# Patient Record
Sex: FEMALE | Race: WHITE | NOT HISPANIC OR LATINO | Employment: FULL TIME | URBAN - METROPOLITAN AREA
[De-identification: names, ages, dates, MRNs, and addresses within clinical notes are randomized per-mention and may not be internally consistent; named-entity substitution may affect disease eponyms.]

---

## 2017-09-14 ENCOUNTER — HOSPITAL ENCOUNTER (OUTPATIENT)
Dept: GENERAL RADIOLOGY | Facility: HOSPITAL | Age: 37
Discharge: HOME | End: 2017-09-14
Admitting: INTERNAL MEDICINE

## 2017-09-14 ENCOUNTER — LAB (OUTPATIENT)
Dept: LAB | Facility: HOSPITAL | Age: 37
End: 2017-09-14
Attending: ORTHOPAEDIC SURGERY

## 2017-09-14 ENCOUNTER — TRANSCRIBE ORDERS (OUTPATIENT)
Dept: LAB | Facility: HOSPITAL | Age: 37
End: 2017-09-14

## 2017-09-14 DIAGNOSIS — Z01.818 PRE-OP TESTING: ICD-10-CM

## 2017-09-14 DIAGNOSIS — Z01.811 PRE-OP CHEST EXAM: ICD-10-CM

## 2017-09-14 DIAGNOSIS — Z01.818 PRE-OP TESTING: Primary | ICD-10-CM

## 2017-09-14 LAB
ALBUMIN SERPL-MCNC: 4.4 G/DL (ref 3.5–5.2)
ALBUMIN/GLOB SERPL: 1.8 G/DL
ALP SERPL-CCNC: 75 U/L (ref 39–117)
ALT SERPL W P-5'-P-CCNC: 28 U/L (ref 1–33)
ANION GAP SERPL CALCULATED.3IONS-SCNC: 10.6 MMOL/L
AST SERPL-CCNC: 20 U/L (ref 1–32)
BASOPHILS # BLD AUTO: 0.02 10*3/MM3 (ref 0–0.2)
BASOPHILS NFR BLD AUTO: 0.3 % (ref 0–1.5)
BILIRUB SERPL-MCNC: <0.2 MG/DL (ref 0.1–1.2)
BILIRUB UR QL STRIP: NEGATIVE
BUN BLD-MCNC: 16 MG/DL (ref 6–20)
BUN/CREAT SERPL: 17.6 (ref 7–25)
CALCIUM SPEC-SCNC: 9.4 MG/DL (ref 8.6–10.5)
CHLORIDE SERPL-SCNC: 108 MMOL/L (ref 98–107)
CLARITY UR: CLEAR
CO2 SERPL-SCNC: 25.4 MMOL/L (ref 22–29)
COLOR UR: YELLOW
CREAT BLD-MCNC: 0.91 MG/DL (ref 0.57–1)
DEPRECATED RDW RBC AUTO: 48.8 FL (ref 37–54)
EOSINOPHIL # BLD AUTO: 0.11 10*3/MM3 (ref 0–0.7)
EOSINOPHIL NFR BLD AUTO: 1.4 % (ref 0.3–6.2)
ERYTHROCYTE [DISTWIDTH] IN BLOOD BY AUTOMATED COUNT: 13.8 % (ref 11.7–13)
GFR SERPL CREATININE-BSD FRML MDRD: 69 ML/MIN/1.73
GLOBULIN UR ELPH-MCNC: 2.5 GM/DL
GLUCOSE BLD-MCNC: 84 MG/DL (ref 65–99)
GLUCOSE UR STRIP-MCNC: NEGATIVE MG/DL
HCT VFR BLD AUTO: 41.3 % (ref 35.6–45.5)
HGB BLD-MCNC: 13.3 G/DL (ref 11.9–15.5)
HGB UR QL STRIP.AUTO: NEGATIVE
IMM GRANULOCYTES # BLD: 0.02 10*3/MM3 (ref 0–0.03)
IMM GRANULOCYTES NFR BLD: 0.3 % (ref 0–0.5)
KETONES UR QL STRIP: NEGATIVE
LEUKOCYTE ESTERASE UR QL STRIP.AUTO: NEGATIVE
LYMPHOCYTES # BLD AUTO: 2.82 10*3/MM3 (ref 0.9–4.8)
LYMPHOCYTES NFR BLD AUTO: 36.8 % (ref 19.6–45.3)
MCH RBC QN AUTO: 31.3 PG (ref 26.9–32)
MCHC RBC AUTO-ENTMCNC: 32.2 G/DL (ref 32.4–36.3)
MCV RBC AUTO: 97.2 FL (ref 80.5–98.2)
MONOCYTES # BLD AUTO: 0.58 10*3/MM3 (ref 0.2–1.2)
MONOCYTES NFR BLD AUTO: 7.6 % (ref 5–12)
NEUTROPHILS # BLD AUTO: 4.12 10*3/MM3 (ref 1.9–8.1)
NEUTROPHILS NFR BLD AUTO: 53.6 % (ref 42.7–76)
NITRITE UR QL STRIP: NEGATIVE
PH UR STRIP.AUTO: 6.5 [PH] (ref 5–8)
PLATELET # BLD AUTO: 249 10*3/MM3 (ref 140–500)
PMV BLD AUTO: 10.1 FL (ref 6–12)
POTASSIUM BLD-SCNC: 4.1 MMOL/L (ref 3.5–5.2)
PROT SERPL-MCNC: 6.9 G/DL (ref 6–8.5)
PROT UR QL STRIP: NEGATIVE
RBC # BLD AUTO: 4.25 10*6/MM3 (ref 3.9–5.2)
SODIUM BLD-SCNC: 144 MMOL/L (ref 136–145)
SP GR UR STRIP: 1.01 (ref 1–1.03)
UROBILINOGEN UR QL STRIP: NORMAL
WBC NRBC COR # BLD: 7.67 10*3/MM3 (ref 4.5–10.7)

## 2017-09-14 PROCEDURE — 81003 URINALYSIS AUTO W/O SCOPE: CPT

## 2017-09-14 PROCEDURE — 80053 COMPREHEN METABOLIC PANEL: CPT

## 2017-09-14 PROCEDURE — 85025 COMPLETE CBC W/AUTO DIFF WBC: CPT

## 2017-09-14 PROCEDURE — 36415 COLL VENOUS BLD VENIPUNCTURE: CPT

## 2017-09-14 PROCEDURE — 71020 HC CHEST PA AND LATERAL: CPT

## 2017-11-13 ENCOUNTER — OFFICE VISIT (OUTPATIENT)
Dept: NEUROSURGERY | Facility: CLINIC | Age: 37
End: 2017-11-13

## 2017-11-13 VITALS
SYSTOLIC BLOOD PRESSURE: 108 MMHG | BODY MASS INDEX: 21.69 KG/M2 | WEIGHT: 135 LBS | TEMPERATURE: 97.5 F | HEIGHT: 66 IN | DIASTOLIC BLOOD PRESSURE: 60 MMHG

## 2017-11-13 DIAGNOSIS — M54.2 NECK PAIN: ICD-10-CM

## 2017-11-13 DIAGNOSIS — M54.12 CERVICAL RADICULOPATHY: Primary | ICD-10-CM

## 2017-11-13 PROCEDURE — 99244 OFF/OP CNSLTJ NEW/EST MOD 40: CPT | Performed by: NEUROLOGICAL SURGERY

## 2017-11-13 RX ORDER — COVID-19 ANTIGEN TEST
220 KIT MISCELLANEOUS 3 TIMES DAILY PRN
Qty: 60 EACH | Refills: 1 | Status: SHIPPED | OUTPATIENT
Start: 2017-11-13 | End: 2018-01-08

## 2017-11-13 RX ORDER — SODIUM CHLORIDE 0.9 % (FLUSH) 0.9 %
1-10 SYRINGE (ML) INJECTION AS NEEDED
Status: CANCELLED | OUTPATIENT
Start: 2017-11-13

## 2017-11-13 RX ORDER — AMITRIPTYLINE HYDROCHLORIDE 100 MG/1
100 TABLET, FILM COATED ORAL NIGHTLY
COMMUNITY
Start: 2017-08-22 | End: 2019-08-18

## 2017-11-13 RX ORDER — PENTOSAN POLYSULFATE SODIUM 100 MG/1
100 CAPSULE, GELATIN COATED ORAL
COMMUNITY
Start: 2017-09-06 | End: 2022-11-25

## 2017-11-13 RX ORDER — GABAPENTIN 300 MG/1
300 CAPSULE ORAL 3 TIMES DAILY
Qty: 90 CAPSULE | Refills: 5 | OUTPATIENT
Start: 2017-11-13 | End: 2017-12-13 | Stop reason: SDUPTHER

## 2017-11-13 RX ORDER — CHLORHEXIDINE GLUCONATE 4 G/100ML
SOLUTION TOPICAL
Qty: 120 ML | Refills: 0 | Status: SHIPPED | OUTPATIENT
Start: 2017-11-13 | End: 2017-11-22 | Stop reason: HOSPADM

## 2017-11-13 NOTE — H&P
Subjective     Chief Complaint: Neck and left arm pain    Patient ID: Mateusz Jordan is a 37 y.o. female seen for consultation today at the request of  FELIX Kim    Neck Pain    This is a new problem. The current episode started more than 1 month ago. The problem occurs constantly. The problem has been unchanged. The pain is associated with a fall. The pain is present in the left side. The quality of the pain is described as aching and burning. The pain is at a severity of 10/10. The pain is severe. The symptoms are aggravated by bending, twisting and position. The pain is same all the time. Stiffness is present in the morning. Associated symptoms include headaches and numbness. Pertinent negatives include no chest pain, fever, leg pain, pain with swallowing, paresis, photophobia, syncope, tingling, trouble swallowing, visual change, weakness or weight loss. She has tried acetaminophen, home exercises, heat, NSAIDs and oral narcotics for the symptoms. The treatment provided no relief.       This is a 37-year-old woman who presents to my office with chief complaints of neck and left arm and shoulder pain.  Her symptoms have been present for several months.  She claims that her symptoms started after a slip and fall she sustained at a nail spa.  She has tried physical therapy and actually underwent a left shoulder arthroscopy to repair a torn labrum.  She is complaining of persistent left-sided neck pain and shooting, neuropathic pain into her left arm.  She has not tried pain management.  She has done physical therapy and has failed oral anti-inflammatories.    The following portions of the patient's history were reviewed and updated as appropriate: allergies, current medications, past family history, past medical history, past social history, past surgical history and problem list.    Family history:   Family History   Problem Relation Age of Onset   • No Known Problems Mother    • Throat cancer Father         Social history:   Social History     Social History   • Marital status:      Spouse name: N/A   • Number of children: N/A   • Years of education: N/A     Occupational History   • Not on file.     Social History Main Topics   • Smoking status: Current Every Day Smoker     Packs/day: 0.50   • Smokeless tobacco: Never Used   • Alcohol use No   • Drug use: No   • Sexual activity: Defer     Other Topics Concern   • Not on file     Social History Narrative       Review of Systems   Constitutional: Negative for activity change, appetite change, chills, diaphoresis, fatigue, fever, unexpected weight change and weight loss.   HENT: Negative for congestion, dental problem, drooling, ear discharge, ear pain, facial swelling, hearing loss, mouth sores, nosebleeds, postnasal drip, rhinorrhea, sinus pressure, sneezing, sore throat, tinnitus, trouble swallowing and voice change.    Eyes: Negative for photophobia, pain, discharge, redness, itching and visual disturbance.   Respiratory: Negative for apnea, cough, choking, chest tightness, shortness of breath, wheezing and stridor.    Cardiovascular: Negative for chest pain, palpitations, leg swelling and syncope.   Gastrointestinal: Negative for abdominal distention, abdominal pain, anal bleeding, blood in stool, constipation, diarrhea, nausea, rectal pain and vomiting.   Endocrine: Negative for cold intolerance, heat intolerance, polydipsia, polyphagia and polyuria.   Genitourinary: Negative for decreased urine volume, difficulty urinating, dysuria, enuresis, flank pain, frequency, genital sores, hematuria and urgency.   Musculoskeletal: Positive for back pain and neck pain. Negative for arthralgias, gait problem, joint swelling, myalgias and neck stiffness.   Skin: Negative for color change, pallor, rash and wound.   Allergic/Immunologic: Negative for environmental allergies, food allergies and immunocompromised state.   Neurological: Positive for numbness and  "headaches. Negative for dizziness, tingling, tremors, seizures, syncope, facial asymmetry, speech difficulty, weakness and light-headedness.   Hematological: Negative for adenopathy. Does not bruise/bleed easily.   Psychiatric/Behavioral: Negative for agitation, behavioral problems, confusion, decreased concentration, dysphoric mood, hallucinations, self-injury, sleep disturbance and suicidal ideas. The patient is not nervous/anxious and is not hyperactive.    All other systems reviewed and are negative.      Objective   Blood pressure 108/60, temperature 97.5 °F (36.4 °C), height 66\" (167.6 cm), weight 135 lb (61.2 kg).  Body mass index is 21.79 kg/(m^2).    Physical Exam   Constitutional: She is oriented to person, place, and time. She appears well-developed.  Non-toxic appearance.   HENT:   Head: Normocephalic and atraumatic.   Right Ear: Hearing normal.   Left Ear: Hearing normal.   Nose: Nose normal.   Eyes: Conjunctivae, EOM and lids are normal. Pupils are equal, round, and reactive to light.   Neck: No JVD present. Muscular tenderness present. Decreased range of motion present.   Cardiovascular: Normal rate and regular rhythm.    Pulses:       Radial pulses are 2+ on the right side, and 2+ on the left side.   Pulmonary/Chest: Effort normal. No stridor. No respiratory distress. She has no wheezes.   Musculoskeletal:        Cervical back: She exhibits decreased range of motion and tenderness.        Arms:  Neurological: She is alert and oriented to person, place, and time. She has normal reflexes. She displays normal reflexes. No cranial nerve deficit. She exhibits normal muscle tone. GCS eye subscore is 4. GCS verbal subscore is 5. GCS motor subscore is 6.   Reflex Scores:       Tricep reflexes are 2+ on the right side and 2+ on the left side.       Bicep reflexes are 2+ on the right side and 2+ on the left side.       Brachioradialis reflexes are 2+ on the right side and 2+ on the left side.  Skin: Skin is " warm and dry. No rash noted. No erythema.   Psychiatric: She has a normal mood and affect. Her behavior is normal. Judgment and thought content normal.   Nursing note and vitals reviewed.        Assessment/Plan     Independent Review of Radiographic Studies:      Available for my review is a MRI of the cervical spine that was performed on 10/20/2017.  This discloses multilevel degenerative disc disease which is worst at C5 6 and C6 7.  At C6 7, there is a laterally extruded disc fragment which extends superiorly behind the C6 body.  This causes moderate to severe lateral recess stenosis at C6 7.  Overall, there is loss of cervical lordosis, however there is no evidence of spondylolisthesis.  The central canal is capacious.  There is no radiographic evidence of spinal cord compression.    Medical Decision Making:      This is a 37-year-old woman with cervical radiculopathy secondary to a ruptured disc at C6 7.  It is impossible for me to ascertain whether or not this disc herniation is secondary to her fall, or if she simply had acute trauma superimposed on chronic degenerative changes.    Nonetheless, she is a candidate for a C6 7 ACDF on the basis of her refractory symptoms.  She was uncertain as to whether or not she would like to proceed with surgery, however so I did give her a prescription for some Neurontin.    In the meantime, we will tentatively schedule her for a C6 7 ACDF as her symptoms are really quite profound and her radiculopathy is significantly affecting her quality of life.    She is at increased risk for suboptimal outcomes following surgery due to the fact that there is pending litigation involved with her neck injury, and she is a smoker, as well as the fact that she had orthopedic trauma to her left shoulder.  I did advise her that she may have persistent neck pain following her operation due to the considerations mentioned above.    Informed consent was obtained for an ACDF. She acknowledges a  risk of spinal cord injury, paralysis, nerve root injury, pain, paralysis, loss of sensation, permanent neurological impairment, tracheal/esophageal injury, hoarseness, dysphaghia, vocal cord palsy, bleeding, infection, CSF leak, iatrogenic instability, exacerbation of adjacent level disease, pseudoarthrosis, instrumentation failure, failure of benefit of the procedure, or need for additional procedures. All questions were answered. No guarantees were given or implied. She elects to proceed.        Mateusz was seen today for neck pain.    Diagnoses and all orders for this visit:    Neck pain  -     gabapentin (NEURONTIN) 300 MG capsule; Take 1 capsule by mouth 3 (Three) Times a Day.  -     Naproxen Sodium 220 MG capsule; Take 220 mg by mouth 3 (Three) Times a Day As Needed (for moderate pain).    Cervical radiculopathy        No Follow-up on file.           This document signed by MARGARETH Daniels MD November 13, 2017 1:23 PM

## 2017-11-15 ENCOUNTER — APPOINTMENT (OUTPATIENT)
Dept: PREADMISSION TESTING | Facility: HOSPITAL | Age: 37
End: 2017-11-15

## 2017-11-15 VITALS — WEIGHT: 136.02 LBS | HEIGHT: 66 IN | BODY MASS INDEX: 21.86 KG/M2

## 2017-11-15 DIAGNOSIS — M54.12 CERVICAL RADICULOPATHY: ICD-10-CM

## 2017-11-15 LAB
ANION GAP SERPL CALCULATED.3IONS-SCNC: 1 MMOL/L (ref 3–11)
BILIRUB UR QL STRIP: NEGATIVE
BUN BLD-MCNC: 20 MG/DL (ref 9–23)
BUN/CREAT SERPL: 22.2 (ref 7–25)
CALCIUM SPEC-SCNC: 9.4 MG/DL (ref 8.7–10.4)
CHLORIDE SERPL-SCNC: 110 MMOL/L (ref 99–109)
CLARITY UR: CLEAR
CO2 SERPL-SCNC: 29 MMOL/L (ref 20–31)
COLOR UR: YELLOW
CREAT BLD-MCNC: 0.9 MG/DL (ref 0.6–1.3)
DEPRECATED RDW RBC AUTO: 45.5 FL (ref 37–54)
ERYTHROCYTE [DISTWIDTH] IN BLOOD BY AUTOMATED COUNT: 13.2 % (ref 11.3–14.5)
GFR SERPL CREATININE-BSD FRML MDRD: 70 ML/MIN/1.73
GLUCOSE BLD-MCNC: 77 MG/DL (ref 70–100)
GLUCOSE UR STRIP-MCNC: NEGATIVE MG/DL
HBA1C MFR BLD: 5.1 % (ref 4.8–5.6)
HCT VFR BLD AUTO: 39.7 % (ref 34.5–44)
HGB BLD-MCNC: 13.1 G/DL (ref 11.5–15.5)
HGB UR QL STRIP.AUTO: NEGATIVE
KETONES UR QL STRIP: NEGATIVE
LEUKOCYTE ESTERASE UR QL STRIP.AUTO: NEGATIVE
MCH RBC QN AUTO: 31 PG (ref 27–31)
MCHC RBC AUTO-ENTMCNC: 33 G/DL (ref 32–36)
MCV RBC AUTO: 94.1 FL (ref 80–99)
NITRITE UR QL STRIP: NEGATIVE
PH UR STRIP.AUTO: 6 [PH] (ref 5–8)
PLATELET # BLD AUTO: 271 10*3/MM3 (ref 150–450)
PMV BLD AUTO: 10.2 FL (ref 6–12)
POTASSIUM BLD-SCNC: 4.3 MMOL/L (ref 3.5–5.5)
PROT UR QL STRIP: NEGATIVE
RBC # BLD AUTO: 4.22 10*6/MM3 (ref 3.89–5.14)
SODIUM BLD-SCNC: 140 MMOL/L (ref 132–146)
SP GR UR STRIP: 1.01 (ref 1–1.03)
UROBILINOGEN UR QL STRIP: NORMAL
WBC NRBC COR # BLD: 7.51 10*3/MM3 (ref 3.5–10.8)

## 2017-11-15 PROCEDURE — 36415 COLL VENOUS BLD VENIPUNCTURE: CPT

## 2017-11-15 PROCEDURE — 87081 CULTURE SCREEN ONLY: CPT | Performed by: ANESTHESIOLOGY

## 2017-11-15 PROCEDURE — 85027 COMPLETE CBC AUTOMATED: CPT | Performed by: NEUROLOGICAL SURGERY

## 2017-11-15 PROCEDURE — 81003 URINALYSIS AUTO W/O SCOPE: CPT | Performed by: NEUROLOGICAL SURGERY

## 2017-11-15 PROCEDURE — 80048 BASIC METABOLIC PNL TOTAL CA: CPT | Performed by: NEUROLOGICAL SURGERY

## 2017-11-15 PROCEDURE — 83036 HEMOGLOBIN GLYCOSYLATED A1C: CPT | Performed by: ANESTHESIOLOGY

## 2017-11-15 NOTE — PAT
Chlorhexidine Prescription given during PAT visit, as well as written and verbal instructions given to patient during PAT visit.

## 2017-11-15 NOTE — DISCHARGE INSTRUCTIONS
The following information and instructions were given:    NPO after MN except sips of water with routine prescribed medication (except blood thinner, diabetes, or weight reducing medication) unless otherwise instructed by your physician.  Do not eat, drink, smoke or chew gum after MN the night before surgery. This also includes no mints.    DO NOT shave for two days before your procedure.  Do not wear makeup.      DO NOT wear fingernail polish (gel/regular) and/or acrylic/artificial nails on the day of surgery.   If a patient had recent manicure and would rather not remove polish or artificial nails, then the minimum requirement is that the polish/artificial nails must be removed from the middle finger on each hand.      If patient was having surgery on an upper extremity, then the patient was instructed that fingernail polish/artificial fingernails must be removed for surgery.  NO EXCEPTIONS.      If patient was having surgery on a lower extremity, then the patient was instructed that toenail polish on both extremities must be removed for surgery.  NO EXCEPTIONS.    Remove all jewelry (advised to go to jeweler if unable to remove).  Jewelry especially rings can no longer be taped for surgery.    Leave anything you consider valuable at home.    Leave your suitcase in the car until after your surgery.    Bring the following with you (if applicable)   -picture ID and insurance cards   -Co-pay/deductible required by insurance   -Medications in the original bottles (not a list) including all over-the-counter  medications if not brought to PAT   -Copy of advance directive, living will or power of  documents if not  brought to PAT   -CPAP or BIPAP mask and tubing (do not bring machine)   -Skin prep instructions sheet   -PAT Pass    Education booklet, brochure, handout or binder given to patient.    Pain Control After Surgery handout given to patient.    Respirex use (handout given to patient) and pneumonia  prevention.    Signs and Symptoms of infection.    DVT Prevention stressing the importance of ambulation.    Patient to apply Chlorhexadine wipes to surgical area (as instructed) the night before procedure and the AM of procedure.      Cervical book given

## 2017-11-17 LAB — MRSA SPEC QL CULT: NORMAL

## 2017-11-21 ENCOUNTER — APPOINTMENT (OUTPATIENT)
Dept: GENERAL RADIOLOGY | Facility: HOSPITAL | Age: 37
End: 2017-11-21

## 2017-11-21 ENCOUNTER — ANESTHESIA EVENT (OUTPATIENT)
Dept: PERIOP | Facility: HOSPITAL | Age: 37
End: 2017-11-21

## 2017-11-21 ENCOUNTER — ANESTHESIA (OUTPATIENT)
Dept: PERIOP | Facility: HOSPITAL | Age: 37
End: 2017-11-21

## 2017-11-21 ENCOUNTER — HOSPITAL ENCOUNTER (OUTPATIENT)
Facility: HOSPITAL | Age: 37
Discharge: HOME | End: 2017-11-23
Attending: NEUROLOGICAL SURGERY | Admitting: NEUROLOGICAL SURGERY

## 2017-11-21 DIAGNOSIS — M54.12 CERVICAL RADICULOPATHY: ICD-10-CM

## 2017-11-21 PROBLEM — M54.2 NECK PAIN: Status: ACTIVE | Noted: 2017-11-21

## 2017-11-21 PROCEDURE — 99024 POSTOP FOLLOW-UP VISIT: CPT | Performed by: NEUROLOGICAL SURGERY

## 2017-11-21 PROCEDURE — 25010000002 HYDROMORPHONE PER 4 MG: Performed by: PHYSICIAN ASSISTANT

## 2017-11-21 PROCEDURE — 25010000002 ONDANSETRON PER 1 MG: Performed by: NURSE ANESTHETIST, CERTIFIED REGISTERED

## 2017-11-21 PROCEDURE — 25010000002 PROMETHAZINE PER 50 MG: Performed by: NURSE ANESTHETIST, CERTIFIED REGISTERED

## 2017-11-21 PROCEDURE — C1713 ANCHOR/SCREW BN/BN,TIS/BN: HCPCS | Performed by: NEUROLOGICAL SURGERY

## 2017-11-21 PROCEDURE — 76000 FLUOROSCOPY <1 HR PHYS/QHP: CPT

## 2017-11-21 PROCEDURE — 25010000002 PHENYLEPHRINE PER 1 ML: Performed by: NURSE ANESTHETIST, CERTIFIED REGISTERED

## 2017-11-21 PROCEDURE — 25010000002 FENTANYL CITRATE (PF) 100 MCG/2ML SOLUTION: Performed by: NURSE ANESTHETIST, CERTIFIED REGISTERED

## 2017-11-21 PROCEDURE — 25010000002 PROPOFOL 1000 MG/ML EMULSION: Performed by: NURSE ANESTHETIST, CERTIFIED REGISTERED

## 2017-11-21 PROCEDURE — 25010000002 ONDANSETRON PER 1 MG: Performed by: PHYSICIAN ASSISTANT

## 2017-11-21 PROCEDURE — 25010000003 CEFAZOLIN IN DEXTROSE 2-4 GM/100ML-% SOLUTION: Performed by: NEUROLOGICAL SURGERY

## 2017-11-21 PROCEDURE — 25010000002 KETOROLAC TROMETHAMINE PER 15 MG: Performed by: NEUROLOGICAL SURGERY

## 2017-11-21 PROCEDURE — 25010000002 NEOSTIGMINE PER 0.5 MG: Performed by: NURSE ANESTHETIST, CERTIFIED REGISTERED

## 2017-11-21 PROCEDURE — 25010000002 PROPOFOL 10 MG/ML EMULSION: Performed by: NURSE ANESTHETIST, CERTIFIED REGISTERED

## 2017-11-21 PROCEDURE — 25010000002 DEXAMETHASONE PER 1 MG: Performed by: NURSE ANESTHETIST, CERTIFIED REGISTERED

## 2017-11-21 PROCEDURE — C2617 STENT, NON-COR, TEM W/O DEL: HCPCS | Performed by: NEUROLOGICAL SURGERY

## 2017-11-21 DEVICE — IMPLANTABLE DEVICE: Type: IMPLANTABLE DEVICE | Site: SPINE CERVICAL | Status: FUNCTIONAL

## 2017-11-21 DEVICE — PLT ZERO/P VA PARL PEEK SPACR LRD TI 8MM: Type: IMPLANTABLE DEVICE | Site: SPINE CERVICAL | Status: FUNCTIONAL

## 2017-11-21 DEVICE — DBX PUTTY, 2.5CC
Type: IMPLANTABLE DEVICE | Site: SPINE CERVICAL | Status: FUNCTIONAL
Brand: DBX®

## 2017-11-21 RX ORDER — SODIUM CHLORIDE 0.9 % (FLUSH) 0.9 %
1-10 SYRINGE (ML) INJECTION AS NEEDED
Status: DISCONTINUED | OUTPATIENT
Start: 2017-11-21 | End: 2017-11-21 | Stop reason: HOSPADM

## 2017-11-21 RX ORDER — MAGNESIUM HYDROXIDE 1200 MG/15ML
LIQUID ORAL AS NEEDED
Status: DISCONTINUED | OUTPATIENT
Start: 2017-11-21 | End: 2017-11-21 | Stop reason: HOSPADM

## 2017-11-21 RX ORDER — FENTANYL CITRATE 50 UG/ML
INJECTION, SOLUTION INTRAMUSCULAR; INTRAVENOUS AS NEEDED
Status: DISCONTINUED | OUTPATIENT
Start: 2017-11-21 | End: 2017-11-21 | Stop reason: SURG

## 2017-11-21 RX ORDER — PROMETHAZINE HYDROCHLORIDE 25 MG/ML
6.25 INJECTION, SOLUTION INTRAMUSCULAR; INTRAVENOUS ONCE AS NEEDED
Status: COMPLETED | OUTPATIENT
Start: 2017-11-21 | End: 2017-11-21

## 2017-11-21 RX ORDER — GLYCOPYRROLATE 0.2 MG/ML
INJECTION INTRAMUSCULAR; INTRAVENOUS AS NEEDED
Status: DISCONTINUED | OUTPATIENT
Start: 2017-11-21 | End: 2017-11-21 | Stop reason: SURG

## 2017-11-21 RX ORDER — HYDROMORPHONE HYDROCHLORIDE 1 MG/ML
0.5 INJECTION, SOLUTION INTRAMUSCULAR; INTRAVENOUS; SUBCUTANEOUS EVERY 6 HOURS
Status: DISCONTINUED | OUTPATIENT
Start: 2017-11-21 | End: 2017-11-21

## 2017-11-21 RX ORDER — PROMETHAZINE HYDROCHLORIDE 25 MG/1
25 TABLET ORAL ONCE AS NEEDED
Status: COMPLETED | OUTPATIENT
Start: 2017-11-21 | End: 2017-11-21

## 2017-11-21 RX ORDER — CEFAZOLIN SODIUM 2 G/100ML
2 INJECTION, SOLUTION INTRAVENOUS EVERY 8 HOURS
Status: COMPLETED | OUTPATIENT
Start: 2017-11-21 | End: 2017-11-21

## 2017-11-21 RX ORDER — MEPERIDINE HYDROCHLORIDE 25 MG/ML
12.5 INJECTION INTRAMUSCULAR; INTRAVENOUS; SUBCUTANEOUS
Status: DISCONTINUED | OUTPATIENT
Start: 2017-11-21 | End: 2017-11-21 | Stop reason: HOSPADM

## 2017-11-21 RX ORDER — LIDOCAINE HYDROCHLORIDE 10 MG/ML
INJECTION, SOLUTION INFILTRATION; PERINEURAL AS NEEDED
Status: DISCONTINUED | OUTPATIENT
Start: 2017-11-21 | End: 2017-11-21 | Stop reason: SURG

## 2017-11-21 RX ORDER — BUPROPION HYDROCHLORIDE 150 MG/1
300 TABLET ORAL EVERY MORNING
Status: DISCONTINUED | OUTPATIENT
Start: 2017-11-22 | End: 2017-11-23 | Stop reason: HOSPADM

## 2017-11-21 RX ORDER — CEFAZOLIN SODIUM 2 G/100ML
2 INJECTION, SOLUTION INTRAVENOUS ONCE
Status: COMPLETED | OUTPATIENT
Start: 2017-11-21 | End: 2017-11-21

## 2017-11-21 RX ORDER — ONDANSETRON 2 MG/ML
INJECTION INTRAMUSCULAR; INTRAVENOUS AS NEEDED
Status: DISCONTINUED | OUTPATIENT
Start: 2017-11-21 | End: 2017-11-21 | Stop reason: SURG

## 2017-11-21 RX ORDER — LIDOCAINE HYDROCHLORIDE 10 MG/ML
0.5 INJECTION, SOLUTION EPIDURAL; INFILTRATION; INTRACAUDAL; PERINEURAL ONCE AS NEEDED
Status: DISCONTINUED | OUTPATIENT
Start: 2017-11-21 | End: 2017-11-21

## 2017-11-21 RX ORDER — HYDROCODONE BITARTRATE AND ACETAMINOPHEN 5; 325 MG/1; MG/1
1 TABLET ORAL EVERY 4 HOURS PRN
Status: DISCONTINUED | OUTPATIENT
Start: 2017-11-21 | End: 2017-11-23 | Stop reason: HOSPADM

## 2017-11-21 RX ORDER — ONDANSETRON 2 MG/ML
4 INJECTION INTRAMUSCULAR; INTRAVENOUS EVERY 6 HOURS PRN
Status: DISCONTINUED | OUTPATIENT
Start: 2017-11-21 | End: 2017-11-22

## 2017-11-21 RX ORDER — FENTANYL CITRATE 50 UG/ML
50 INJECTION, SOLUTION INTRAMUSCULAR; INTRAVENOUS
Status: DISCONTINUED | OUTPATIENT
Start: 2017-11-21 | End: 2017-11-21 | Stop reason: HOSPADM

## 2017-11-21 RX ORDER — HYDROMORPHONE HYDROCHLORIDE 1 MG/ML
0.5 INJECTION, SOLUTION INTRAMUSCULAR; INTRAVENOUS; SUBCUTANEOUS
Status: DISCONTINUED | OUTPATIENT
Start: 2017-11-21 | End: 2017-11-21 | Stop reason: HOSPADM

## 2017-11-21 RX ORDER — DEXAMETHASONE SODIUM PHOSPHATE 4 MG/ML
INJECTION, SOLUTION INTRA-ARTICULAR; INTRALESIONAL; INTRAMUSCULAR; INTRAVENOUS; SOFT TISSUE AS NEEDED
Status: DISCONTINUED | OUTPATIENT
Start: 2017-11-21 | End: 2017-11-21 | Stop reason: SURG

## 2017-11-21 RX ORDER — SCOLOPAMINE TRANSDERMAL SYSTEM 1 MG/1
1 PATCH, EXTENDED RELEASE TRANSDERMAL ONCE
Status: DISCONTINUED | OUTPATIENT
Start: 2017-11-21 | End: 2017-11-22

## 2017-11-21 RX ORDER — HYDROMORPHONE HYDROCHLORIDE 1 MG/ML
0.5 INJECTION, SOLUTION INTRAMUSCULAR; INTRAVENOUS; SUBCUTANEOUS EVERY 6 HOURS PRN
Status: DISCONTINUED | OUTPATIENT
Start: 2017-11-21 | End: 2017-11-23 | Stop reason: HOSPADM

## 2017-11-21 RX ORDER — FAMOTIDINE 10 MG/ML
20 INJECTION, SOLUTION INTRAVENOUS ONCE
Status: DISCONTINUED | OUTPATIENT
Start: 2017-11-21 | End: 2017-11-21

## 2017-11-21 RX ORDER — SODIUM CHLORIDE 0.9 % (FLUSH) 0.9 %
1-10 SYRINGE (ML) INJECTION AS NEEDED
Status: DISCONTINUED | OUTPATIENT
Start: 2017-11-21 | End: 2017-11-23 | Stop reason: HOSPADM

## 2017-11-21 RX ORDER — FAMOTIDINE 20 MG/1
20 TABLET, FILM COATED ORAL ONCE
Status: DISCONTINUED | OUTPATIENT
Start: 2017-11-21 | End: 2017-11-21

## 2017-11-21 RX ORDER — TOPIRAMATE 100 MG/1
100 TABLET, FILM COATED ORAL EVERY 12 HOURS SCHEDULED
Status: DISCONTINUED | OUTPATIENT
Start: 2017-11-21 | End: 2017-11-23 | Stop reason: HOSPADM

## 2017-11-21 RX ORDER — ATRACURIUM BESYLATE 10 MG/ML
INJECTION, SOLUTION INTRAVENOUS AS NEEDED
Status: DISCONTINUED | OUTPATIENT
Start: 2017-11-21 | End: 2017-11-21 | Stop reason: SURG

## 2017-11-21 RX ORDER — SENNA AND DOCUSATE SODIUM 50; 8.6 MG/1; MG/1
1 TABLET, FILM COATED ORAL NIGHTLY PRN
Status: DISCONTINUED | OUTPATIENT
Start: 2017-11-21 | End: 2017-11-23 | Stop reason: HOSPADM

## 2017-11-21 RX ORDER — PANTOPRAZOLE SODIUM 40 MG/1
40 TABLET, DELAYED RELEASE ORAL EVERY MORNING
Status: DISCONTINUED | OUTPATIENT
Start: 2017-11-22 | End: 2017-11-23 | Stop reason: HOSPADM

## 2017-11-21 RX ORDER — PROMETHAZINE HYDROCHLORIDE 25 MG/1
25 SUPPOSITORY RECTAL ONCE AS NEEDED
Status: COMPLETED | OUTPATIENT
Start: 2017-11-21 | End: 2017-11-21

## 2017-11-21 RX ORDER — PROPOFOL 10 MG/ML
VIAL (ML) INTRAVENOUS AS NEEDED
Status: DISCONTINUED | OUTPATIENT
Start: 2017-11-21 | End: 2017-11-21 | Stop reason: SURG

## 2017-11-21 RX ORDER — SODIUM CHLORIDE, SODIUM LACTATE, POTASSIUM CHLORIDE, CALCIUM CHLORIDE 600; 310; 30; 20 MG/100ML; MG/100ML; MG/100ML; MG/100ML
9 INJECTION, SOLUTION INTRAVENOUS CONTINUOUS
Status: DISCONTINUED | OUTPATIENT
Start: 2017-11-21 | End: 2017-11-23 | Stop reason: HOSPADM

## 2017-11-21 RX ORDER — LIDOCAINE HYDROCHLORIDE AND EPINEPHRINE 5; 5 MG/ML; UG/ML
INJECTION, SOLUTION INFILTRATION; PERINEURAL AS NEEDED
Status: DISCONTINUED | OUTPATIENT
Start: 2017-11-21 | End: 2017-11-21 | Stop reason: HOSPADM

## 2017-11-21 RX ORDER — KETOROLAC TROMETHAMINE 30 MG/ML
15 INJECTION, SOLUTION INTRAMUSCULAR; INTRAVENOUS EVERY 6 HOURS PRN
Status: DISPENSED | OUTPATIENT
Start: 2017-11-21 | End: 2017-11-22

## 2017-11-21 RX ORDER — GABAPENTIN 300 MG/1
300 CAPSULE ORAL 3 TIMES DAILY
Status: DISCONTINUED | OUTPATIENT
Start: 2017-11-21 | End: 2017-11-23 | Stop reason: HOSPADM

## 2017-11-21 RX ORDER — SODIUM CHLORIDE, SODIUM LACTATE, POTASSIUM CHLORIDE, CALCIUM CHLORIDE 600; 310; 30; 20 MG/100ML; MG/100ML; MG/100ML; MG/100ML
9 INJECTION, SOLUTION INTRAVENOUS CONTINUOUS PRN
Status: DISCONTINUED | OUTPATIENT
Start: 2017-11-21 | End: 2017-11-21 | Stop reason: HOSPADM

## 2017-11-21 RX ORDER — LIDOCAINE HYDROCHLORIDE 10 MG/ML
0.5 INJECTION, SOLUTION EPIDURAL; INFILTRATION; INTRACAUDAL; PERINEURAL ONCE AS NEEDED
Status: COMPLETED | OUTPATIENT
Start: 2017-11-21 | End: 2017-11-21

## 2017-11-21 RX ORDER — AMITRIPTYLINE HYDROCHLORIDE 50 MG/1
100 TABLET, FILM COATED ORAL NIGHTLY
Status: DISCONTINUED | OUTPATIENT
Start: 2017-11-21 | End: 2017-11-23 | Stop reason: HOSPADM

## 2017-11-21 RX ADMIN — Medication 3.5 MG: at 08:47

## 2017-11-21 RX ADMIN — FENTANYL CITRATE 100 MCG: 50 INJECTION, SOLUTION INTRAMUSCULAR; INTRAVENOUS at 07:35

## 2017-11-21 RX ADMIN — PHENYLEPHRINE HYDROCHLORIDE 100 MCG: 10 INJECTION INTRAVENOUS at 07:48

## 2017-11-21 RX ADMIN — ONDANSETRON 4 MG: 2 INJECTION INTRAMUSCULAR; INTRAVENOUS at 20:40

## 2017-11-21 RX ADMIN — FENTANYL CITRATE 50 MCG: 50 INJECTION INTRAMUSCULAR; INTRAVENOUS at 09:25

## 2017-11-21 RX ADMIN — CEFAZOLIN SODIUM 2 G: 2 INJECTION, SOLUTION INTRAVENOUS at 23:03

## 2017-11-21 RX ADMIN — FENTANYL CITRATE 50 MCG: 50 INJECTION INTRAMUSCULAR; INTRAVENOUS at 09:58

## 2017-11-21 RX ADMIN — HYDROCODONE BITARTRATE AND ACETAMINOPHEN 1 TABLET: 5; 325 TABLET ORAL at 19:30

## 2017-11-21 RX ADMIN — DEXAMETHASONE SODIUM PHOSPHATE 10 MG: 4 INJECTION, SOLUTION INTRAMUSCULAR; INTRAVENOUS at 07:41

## 2017-11-21 RX ADMIN — PROPOFOL 30 MCG/KG/MIN: 10 INJECTION, EMULSION INTRAVENOUS at 07:37

## 2017-11-21 RX ADMIN — ONDANSETRON 4 MG: 2 INJECTION INTRAMUSCULAR; INTRAVENOUS at 13:53

## 2017-11-21 RX ADMIN — SODIUM CHLORIDE, POTASSIUM CHLORIDE, SODIUM LACTATE AND CALCIUM CHLORIDE 9 ML/HR: 600; 310; 30; 20 INJECTION, SOLUTION INTRAVENOUS at 10:02

## 2017-11-21 RX ADMIN — PROMETHAZINE HYDROCHLORIDE 6.25 MG: 25 INJECTION INTRAMUSCULAR; INTRAVENOUS at 09:17

## 2017-11-21 RX ADMIN — EPHEDRINE SULFATE 10 MG: 50 INJECTION INTRAMUSCULAR; INTRAVENOUS; SUBCUTANEOUS at 07:48

## 2017-11-21 RX ADMIN — LIDOCAINE HYDROCHLORIDE 0.5 ML: 10 INJECTION, SOLUTION EPIDURAL; INFILTRATION; INTRACAUDAL; PERINEURAL at 06:44

## 2017-11-21 RX ADMIN — TOPIRAMATE 100 MG: 100 TABLET, FILM COATED ORAL at 23:03

## 2017-11-21 RX ADMIN — GLYCOPYRROLATE 0.4 MG: 0.2 INJECTION, SOLUTION INTRAMUSCULAR; INTRAVENOUS at 08:47

## 2017-11-21 RX ADMIN — EPHEDRINE SULFATE 10 MG: 50 INJECTION INTRAMUSCULAR; INTRAVENOUS; SUBCUTANEOUS at 07:46

## 2017-11-21 RX ADMIN — SODIUM CHLORIDE, POTASSIUM CHLORIDE, SODIUM LACTATE AND CALCIUM CHLORIDE 9 ML/HR: 600; 310; 30; 20 INJECTION, SOLUTION INTRAVENOUS at 06:44

## 2017-11-21 RX ADMIN — SODIUM CHLORIDE, POTASSIUM CHLORIDE, SODIUM LACTATE AND CALCIUM CHLORIDE: 600; 310; 30; 20 INJECTION, SOLUTION INTRAVENOUS at 07:30

## 2017-11-21 RX ADMIN — ONDANSETRON 4 MG: 2 INJECTION INTRAMUSCULAR; INTRAVENOUS at 08:39

## 2017-11-21 RX ADMIN — GABAPENTIN 300 MG: 300 CAPSULE ORAL at 16:33

## 2017-11-21 RX ADMIN — HYDROCODONE BITARTRATE AND ACETAMINOPHEN 1 TABLET: 5; 325 TABLET ORAL at 13:36

## 2017-11-21 RX ADMIN — KETOROLAC TROMETHAMINE 15 MG: 30 INJECTION, SOLUTION INTRAMUSCULAR at 11:54

## 2017-11-21 RX ADMIN — SCOPALAMINE 1 PATCH: 1 PATCH, EXTENDED RELEASE TRANSDERMAL at 07:17

## 2017-11-21 RX ADMIN — TOPIRAMATE 100 MG: 100 TABLET, FILM COATED ORAL at 16:33

## 2017-11-21 RX ADMIN — HYDROMORPHONE HYDROCHLORIDE 0.5 MG: 1 INJECTION, SOLUTION INTRAMUSCULAR; INTRAVENOUS; SUBCUTANEOUS at 23:04

## 2017-11-21 RX ADMIN — HYDROMORPHONE HYDROCHLORIDE 0.5 MG: 1 INJECTION, SOLUTION INTRAMUSCULAR; INTRAVENOUS; SUBCUTANEOUS at 16:56

## 2017-11-21 RX ADMIN — AMITRIPTYLINE HYDROCHLORIDE 100 MG: 50 TABLET, FILM COATED ORAL at 19:29

## 2017-11-21 RX ADMIN — FENTANYL CITRATE 50 MCG: 50 INJECTION INTRAMUSCULAR; INTRAVENOUS at 09:15

## 2017-11-21 RX ADMIN — PENTOSAN POLYSULFATE SODIUM 100 MG: 100 CAPSULE, GELATIN COATED ORAL at 19:30

## 2017-11-21 RX ADMIN — FENTANYL CITRATE 50 MCG: 50 INJECTION INTRAMUSCULAR; INTRAVENOUS at 10:32

## 2017-11-21 RX ADMIN — GABAPENTIN 300 MG: 300 CAPSULE ORAL at 23:03

## 2017-11-21 RX ADMIN — LIDOCAINE HYDROCHLORIDE 50 MG: 10 INJECTION, SOLUTION INFILTRATION; PERINEURAL at 07:35

## 2017-11-21 RX ADMIN — PROPOFOL 150 MG: 10 INJECTION, EMULSION INTRAVENOUS at 07:35

## 2017-11-21 RX ADMIN — ATRACURIUM BESYLATE 40 MG: 10 INJECTION, SOLUTION INTRAVENOUS at 07:35

## 2017-11-21 RX ADMIN — CEFAZOLIN SODIUM 2 G: 2 INJECTION, SOLUTION INTRAVENOUS at 16:33

## 2017-11-21 RX ADMIN — CEFAZOLIN SODIUM 2 G: 2 INJECTION, SOLUTION INTRAVENOUS at 07:30

## 2017-11-21 RX ADMIN — KETOROLAC TROMETHAMINE 15 MG: 30 INJECTION, SOLUTION INTRAMUSCULAR at 13:31

## 2017-11-21 NOTE — BRIEF OP NOTE
CERVICAL DISCECTOMY ANTERIOR WITH FUSION  Progress Note    Mateusz Jordan  11/21/2017    Pre-op Diagnosis:   Cervical radiculopathy [M54.12]       Post-Op Diagnosis Codes:     * Cervical radiculopathy [M54.12]    Procedure/CPT® Codes:  NH ARTHRODESIS ANT INTERBODY INC DISCECTOMY, CERVICAL BELOW C2 [82379]    Procedure(s):  C6-7 CERVICAL DISCECTOMY ANTERIOR FUSION WITH INSTRUMENTATION    Surgeon(s):  Andrew Daniels MD    Anesthesia: General    Staff:   Circulator: Julienne Rodríguez RN  Radiology Technologist: Rock Ortiz RT  Scrub Person: Puja Hart  Nursing Assistant: Leandro Alcaraz  Assistant: Payal Marcano PA-C    Estimated Blood Loss: 20 mL    Urine Voided: * No values recorded between 11/21/2017  7:30 AM and 11/21/2017  8:54 AM *    Specimens:                None      Drains:   Drain/Device Site 11/21/17 cervical spine collapsible closed device 1 (Active)           Findings: Large fragment of intervertebral disc removed from the left lateral recess at C6 7.  Status post uncomplicated C6 7 ACDF.  Intraoperative fluoroscopy confirmed satisfactory placement of the implanted spinal instrumentation, and confirmed the operative level.    Complications: None      Andrew Daniels MD     Date: 11/21/2017  Time: 9:02 AM

## 2017-11-21 NOTE — ANESTHESIA PROCEDURE NOTES
Airway  Urgency: elective    Date/Time: 11/21/2017 7:36 AM  End Time:11/21/2017 7:36 AM  Airway not difficult    General Information and Staff    Patient location during procedure: OR  CRNA: [facility]    Indications and Patient Condition  Indications for airway management: airway protection    Preoxygenated: yes  MILS not maintained throughout  Mask difficulty assessment: 1 - vent by mask    Final Airway Details  Final airway type: endotracheal airway      Successful airway: ETT  Cuffed: yes   Successful intubation technique: direct laryngoscopy  Endotracheal tube insertion site: oral  Blade: Lux  Blade size: #3  ETT size: 7.0 mm  Cormack-Lehane Classification: grade I - full view of glottis  Placement verified by: chest auscultation and capnometry   Cuff volume (mL): 5  Measured from: lips  ETT to lips (cm): 20  Number of attempts at approach: 1    Additional Comments  Negative epigastric sounds, Breath sound equal bilaterally with symmetric chest rise and fall

## 2017-11-21 NOTE — OP NOTE
Pre-operative diagnosis: Cervical radiculopathy   Post-operative diagnosis: Same.    Procedure in detail: The patient was identified in the preoperative holding area and brought to the operating suite where She was transferred to the operating table. she then underwent the uneventful induction of general, endotracheal anesthesia. Venodynes were placed by the nursing staff. Her head was gently extended and a shoulder roll was placed transversely under the shoulders. The C-arm was used to localize the skin overlying the operative level. The skin was prepped with alcohol and an obliquely-oriented skin incision extending from the medial border of the right  sternocleidomastoid to the midline was then drawn. The operative field was then shaved, prepped, and draped in the usual sterile fashion. A time out was performed and intravenous antibiotics were administered. Local anesthetic with epinephrine was then infiltrated into the skin underlying the planned incision. A 15 blade was used to make the skin incision. Bipolar electrocautery was used for hemostasis. A self-retaining retractor was placed and sequentially advanced. The platysma was sharply opened. The avascular plane medial to the sternocleidomastoid and lateral to the midline structures was developed using a combination of blunt and sharp dissection. The Cloward retractor was used to retract the midline structures to the contralateral side. A peanut was used to bluntly develop the prevertebral space. The longus coli was opened with a protected Bovie.    The C6/7 disc space was identified and the level was confirmed with lateral fluoroscopy.    The Bovie was used to clear off the anterior aspects of the vertebral bodies above and below the operative level. The Trimline retractor blades were placed. Walters pins were placed in the vertebral bodies above and below the operative level. An annulotomy was carried out using a 15 blade. Gentle distraction was applied  across the disc space via the Denver pins. A complete discectomy was then carried out using the Shannon curettes, pituitary rongeurs, and the high-speed drill. The posterior longitudinal ligament was then opened using a Black hook and the posterior osteophytes were resected using the Kerrison rongeurs. The endplates were meticulously prepared using the Shannon curettes. After trialling, a 8 mm Depuy Zero-P interbody was assembled on the back table, packed with allograft, and inserted into the disc space. Fluoroscopy was used to confirm the operative level and demonstrated satisfactory positioning of the implant.     2 screws measuring 2.5 x 14 mm each were then driven through the anterior plate and delivered into the vertebral bodies at C6 and C7.    PA and lateral fluoroscopy demonstrated satisfactory positioning of the implants and again confirmed the operative level(s). The field was then copiously irrigated with irrigation. Hemostasis was excellent. A 7 ZOE drain was placed in the pre-vertebral space and tunneled out through a separate stab incision. The platysma and skin were closed in layers. Glue and a sterile dressing were then applied.     Sponge, instrument and needle counts were all correct at the end of the case.  I performed this procedure with the assistance of Payal Marcano, physician assistant.

## 2017-11-21 NOTE — ANESTHESIA PREPROCEDURE EVALUATION
Anesthesia Evaluation     Patient summary reviewed   history of anesthetic complications: PONV  NPO Solid Status: > 8 hours  NPO Liquid Status: > 8 hours     Airway   Mallampati: II  TM distance: >3 FB  Neck ROM: full  no difficulty expected  Comment: No changes in parathesia with neck extension  Dental - normal exam     Pulmonary - normal exam   (+) a smoker Former,   Cardiovascular - negative cardio ROS  Exercise tolerance: good (4-7 METS)    Rhythm: regular  Rate: normal        Neuro/Psych  (+) headaches (migraines), numbness (radiculopathy in left hand),    GI/Hepatic/Renal/Endo    (+)  GERD well controlled,   (-) liver disease, no renal disease, diabetes, hypothyroidism    Musculoskeletal     (+) arthralgias, back pain, neck pain,       ROS comment: Left shoulder surgery in September with limited ROM now  Abdominal    Substance History - negative use     OB/GYN          Other   (+) arthritis                                     Anesthesia Plan    ASA 2     general   (Labs/studies reviewed  Scop patch  Propofol gtt with VA)  intravenous induction   Anesthetic plan and risks discussed with patient.  Use of blood products discussed with patient  Consented to blood products.   Plan discussed with CRNA.

## 2017-11-21 NOTE — ANESTHESIA POSTPROCEDURE EVALUATION
Patient: Mateusz Jordan    Procedure Summary     Date Anesthesia Start Anesthesia Stop Room / Location    11/21/17 0730 0900 [facility] / [facility]       Procedure Diagnosis Surgeon Provider    C6-7 CERVICAL DISCECTOMY ANTERIOR FUSION WITH INSTRUMENTATION (N/A Spine Cervical) Cervical radiculopathy  (Cervical radiculopathy [M54.12]) MD Yamel Rodrigez MD          Anesthesia Type: general  Last vitals  BP   104/69 (11/21/17 0648)   Temp   97.1 °F (36.2 °C) (11/21/17 0648)   Pulse   77 (11/21/17 0648)   Resp   18 (11/21/17 0648)     SpO2   99 % (11/21/17 0648)     Post Anesthesia Care and Evaluation    Patient location during evaluation: PACU  Patient participation: complete - patient participated  Level of consciousness: awake and alert  Pain score: 0  Pain management: adequate  Airway patency: patent  Anesthetic complications: No anesthetic complications  PONV Status: none  Cardiovascular status: hemodynamically stable and acceptable  Respiratory status: nonlabored ventilation, acceptable and nasal cannula  Hydration status: acceptable

## 2017-11-21 NOTE — INTERVAL H&P NOTE
"H&P reviewed. The patient was examined and there are no changes to the H&P.   /69 (BP Location: Right arm, Patient Position: Lying)  Pulse 77  Temp 97.1 °F (36.2 °C) (Temporal Artery )   Resp 18  Ht 66\" (167.6 cm)  Wt 136 lb 0.4 oz (61.7 kg)  SpO2 99%  Breastfeeding? No  BMI 21.96 kg/m2  Lungs clear   Heart regular rhythm without murmur  Allergies:  Morphine and related and Sulfa antibiotics  No allergy to latex or contrast dye  Immunizations  pneumo neg  Flu neg  Tetanus ?  Tobacco  Quit 11/13/17   Sofi Fields PA-C  11/21/17  07:19  "

## 2017-11-21 NOTE — ANESTHESIA POSTPROCEDURE EVALUATION
Patient: Mateusz Jordan    Procedure Summary     Date Anesthesia Start Anesthesia Stop Room / Location    11/21/17 0730 0900 [facility]       Procedure Diagnosis Surgeon Provider    C6-7 CERVICAL DISCECTOMY ANTERIOR FUSION WITH INSTRUMENTATION (N/A Spine Cervical) Cervical radiculopathy  (Cervical radiculopathy [M54.12]) MD Yamel Rodrigez MD          Anesthesia Type: general  Last vitals  BP   120/71 (11/21/17 0901)   Temp   97.3 °F (36.3 °C) (11/21/17 0901)   Pulse   94 (11/21/17 0901)   Resp   10 (11/21/17 0901)     SpO2   100 % (11/21/17 0901)     Post Anesthesia Care and Evaluation    Patient location during evaluation: PACU  Patient participation: complete - patient participated  Level of consciousness: awake and alert  Pain score: 0  Pain management: adequate  Airway patency: patent  Anesthetic complications: No anesthetic complications  PONV Status: none  Cardiovascular status: hemodynamically stable and acceptable  Respiratory status: nonlabored ventilation, acceptable and nasal cannula  Hydration status: acceptable

## 2017-11-22 PROBLEM — M54.12 CERVICAL RADICULOPATHY: Status: ACTIVE | Noted: 2017-11-13

## 2017-11-22 LAB
GLUCOSE BLDC GLUCOMTR-MCNC: 88 MG/DL (ref 70–130)
HCT VFR BLD AUTO: 35.3 % (ref 34.5–44)
HGB BLD-MCNC: 11.2 G/DL (ref 11.5–15.5)

## 2017-11-22 PROCEDURE — 82962 GLUCOSE BLOOD TEST: CPT

## 2017-11-22 PROCEDURE — 25010000002 ONDANSETRON PER 1 MG: Performed by: PHYSICIAN ASSISTANT

## 2017-11-22 PROCEDURE — 85014 HEMATOCRIT: CPT | Performed by: PHYSICIAN ASSISTANT

## 2017-11-22 PROCEDURE — 85018 HEMOGLOBIN: CPT | Performed by: PHYSICIAN ASSISTANT

## 2017-11-22 PROCEDURE — 25010000002 HYDROMORPHONE PER 4 MG: Performed by: PHYSICIAN ASSISTANT

## 2017-11-22 PROCEDURE — 25010000002 HYDROMORPHONE PER 4 MG: Performed by: NEUROLOGICAL SURGERY

## 2017-11-22 RX ORDER — ONDANSETRON 4 MG/1
4 TABLET, FILM COATED ORAL EVERY 6 HOURS PRN
Status: DISCONTINUED | OUTPATIENT
Start: 2017-11-22 | End: 2017-11-23 | Stop reason: HOSPADM

## 2017-11-22 RX ORDER — HYDROMORPHONE HYDROCHLORIDE 1 MG/ML
0.5 INJECTION, SOLUTION INTRAMUSCULAR; INTRAVENOUS; SUBCUTANEOUS
Status: COMPLETED | OUTPATIENT
Start: 2017-11-22 | End: 2017-11-22

## 2017-11-22 RX ORDER — ONDANSETRON 4 MG/1
4 TABLET, FILM COATED ORAL EVERY 8 HOURS PRN
Qty: 30 TABLET | Refills: 0 | Status: SHIPPED | OUTPATIENT
Start: 2017-11-22 | End: 2019-05-16

## 2017-11-22 RX ORDER — HYDROCODONE BITARTRATE AND ACETAMINOPHEN 7.5; 325 MG/1; MG/1
1 TABLET ORAL EVERY 4 HOURS PRN
Qty: 60 TABLET | Refills: 0 | Status: SHIPPED | OUTPATIENT
Start: 2017-11-22 | End: 2018-06-18

## 2017-11-22 RX ORDER — CYCLOBENZAPRINE HCL 10 MG
10 TABLET ORAL 3 TIMES DAILY PRN
Status: DISCONTINUED | OUTPATIENT
Start: 2017-11-22 | End: 2017-11-23 | Stop reason: HOSPADM

## 2017-11-22 RX ADMIN — AMITRIPTYLINE HYDROCHLORIDE 100 MG: 50 TABLET, FILM COATED ORAL at 20:57

## 2017-11-22 RX ADMIN — ONDANSETRON 4 MG: 4 TABLET, FILM COATED ORAL at 17:14

## 2017-11-22 RX ADMIN — Medication 1 TABLET: at 08:59

## 2017-11-22 RX ADMIN — HYDROCODONE BITARTRATE AND ACETAMINOPHEN 1 TABLET: 5; 325 TABLET ORAL at 15:20

## 2017-11-22 RX ADMIN — ONDANSETRON 4 MG: 2 INJECTION INTRAMUSCULAR; INTRAVENOUS at 02:40

## 2017-11-22 RX ADMIN — PENTOSAN POLYSULFATE SODIUM 100 MG: 100 CAPSULE, GELATIN COATED ORAL at 06:05

## 2017-11-22 RX ADMIN — GABAPENTIN 300 MG: 300 CAPSULE ORAL at 15:25

## 2017-11-22 RX ADMIN — PANTOPRAZOLE SODIUM 40 MG: 40 TABLET, DELAYED RELEASE ORAL at 06:05

## 2017-11-22 RX ADMIN — TOPIRAMATE 100 MG: 100 TABLET, FILM COATED ORAL at 20:57

## 2017-11-22 RX ADMIN — HYDROMORPHONE HYDROCHLORIDE 0.5 MG: 1 INJECTION, SOLUTION INTRAMUSCULAR; INTRAVENOUS; SUBCUTANEOUS at 18:03

## 2017-11-22 RX ADMIN — BUPROPION HYDROCHLORIDE 300 MG: 150 TABLET, FILM COATED, EXTENDED RELEASE ORAL at 06:05

## 2017-11-22 RX ADMIN — GABAPENTIN 300 MG: 300 CAPSULE ORAL at 08:55

## 2017-11-22 RX ADMIN — HYDROCODONE BITARTRATE AND ACETAMINOPHEN 1 TABLET: 5; 325 TABLET ORAL at 08:55

## 2017-11-22 RX ADMIN — SODIUM CHLORIDE 1000 ML: 9 INJECTION, SOLUTION INTRAVENOUS at 14:30

## 2017-11-22 RX ADMIN — HYDROMORPHONE HYDROCHLORIDE 0.5 MG: 1 INJECTION, SOLUTION INTRAMUSCULAR; INTRAVENOUS; SUBCUTANEOUS at 12:19

## 2017-11-22 RX ADMIN — CYCLOBENZAPRINE HYDROCHLORIDE 10 MG: 10 TABLET, FILM COATED ORAL at 17:14

## 2017-11-22 RX ADMIN — TOPIRAMATE 100 MG: 100 TABLET, FILM COATED ORAL at 08:55

## 2017-11-22 RX ADMIN — HYDROCODONE BITARTRATE AND ACETAMINOPHEN 1 TABLET: 5; 325 TABLET ORAL at 02:40

## 2017-11-22 RX ADMIN — HYDROMORPHONE HYDROCHLORIDE 0.5 MG: 1 INJECTION, SOLUTION INTRAMUSCULAR; INTRAVENOUS; SUBCUTANEOUS at 06:06

## 2017-11-22 RX ADMIN — HYDROMORPHONE HYDROCHLORIDE 0.5 MG: 1 INJECTION, SOLUTION INTRAMUSCULAR; INTRAVENOUS; SUBCUTANEOUS at 21:01

## 2017-11-22 RX ADMIN — GABAPENTIN 300 MG: 300 CAPSULE ORAL at 20:57

## 2017-11-22 NOTE — DISCHARGE SUMMARY
Date of admission: 11/21/17  Date of discharge: 11/22/17    Reason for hospitalization: Admitted following an elective C6 7 ACDF    Hospital course: The patient was admitted following an elective C6 7 ACDF which was performed on the day of admission without complication.  She was observed overnight in the hospital, and discharged the following day.  On the day of discharge, she is ambulating, tolerating a regular diet, and her pain is adequately controlled with oral medications.    Discharge diet: As tolerated  Discharge activities: No lifting heavier than 30 pounds  Discharge medications: Resume all outpatient medications.  I added Norco and Zofran    Follow-up: Neurosurgery PA clinic 2 weeks

## 2017-11-22 NOTE — PLAN OF CARE
Problem: Patient Care Overview (Adult)  Goal: Plan of Care Review  Outcome: Ongoing (interventions implemented as appropriate)    11/21/17 1929 11/22/17 0437   Patient Care Overview   Progress --  improving   Outcome Evaluation   Outcome Summary/Follow up Plan --  Patient rested throuhout the night. PRN medications given to treat pain, see MAR. Patient slightly hypotensive but not far from baseline and asymptomatic at rest, will continue to monitor. Reports some nausea with ambulation, ambulated a short distance in the hallway, and to the bathroom in the room x2 (as of this point) this shift.    Coping/Psychosocial Response Interventions   Plan Of Care Reviewed With patient --

## 2017-11-22 NOTE — DISCHARGE INSTR - ACTIVITY
Ambulate frequently and as tolerated  Do NOT bend, twist or lift excessively  Monitor incision for signs and symptoms of infection

## 2017-11-23 VITALS
TEMPERATURE: 96.5 F | OXYGEN SATURATION: 98 % | WEIGHT: 136.03 LBS | SYSTOLIC BLOOD PRESSURE: 97 MMHG | BODY MASS INDEX: 21.86 KG/M2 | HEIGHT: 66 IN | RESPIRATION RATE: 16 BRPM | HEART RATE: 96 BPM | DIASTOLIC BLOOD PRESSURE: 53 MMHG

## 2017-11-23 RX ADMIN — CYCLOBENZAPRINE HYDROCHLORIDE 10 MG: 10 TABLET, FILM COATED ORAL at 00:19

## 2017-11-23 RX ADMIN — TOPIRAMATE 100 MG: 100 TABLET, FILM COATED ORAL at 08:36

## 2017-11-23 RX ADMIN — PENTOSAN POLYSULFATE SODIUM 100 MG: 100 CAPSULE, GELATIN COATED ORAL at 06:01

## 2017-11-23 RX ADMIN — PANTOPRAZOLE SODIUM 40 MG: 40 TABLET, DELAYED RELEASE ORAL at 06:01

## 2017-11-23 RX ADMIN — BUPROPION HYDROCHLORIDE 300 MG: 150 TABLET, FILM COATED, EXTENDED RELEASE ORAL at 06:01

## 2017-11-23 RX ADMIN — CYCLOBENZAPRINE HYDROCHLORIDE 10 MG: 10 TABLET, FILM COATED ORAL at 10:06

## 2017-11-23 RX ADMIN — HYDROCODONE BITARTRATE AND ACETAMINOPHEN 1 TABLET: 5; 325 TABLET ORAL at 10:03

## 2017-11-23 RX ADMIN — GABAPENTIN 300 MG: 300 CAPSULE ORAL at 08:35

## 2017-11-23 RX ADMIN — HYDROCODONE BITARTRATE AND ACETAMINOPHEN 1 TABLET: 5; 325 TABLET ORAL at 00:19

## 2017-11-23 RX ADMIN — HYDROCODONE BITARTRATE AND ACETAMINOPHEN 1 TABLET: 5; 325 TABLET ORAL at 06:04

## 2017-11-23 NOTE — PLAN OF CARE
Problem: Patient Care Overview (Adult)  Goal: Plan of Care Review  Outcome: Ongoing (interventions implemented as appropriate)    11/23/17 0507   Patient Care Overview   Progress no change   Coping/Psychosocial Response Interventions   Plan Of Care Reviewed With patient

## 2017-11-24 NOTE — PROGRESS NOTES
Subjective: no events overnight    Objective:    Vitals:    17 0700   BP: 97/53   Pulse: 96   Resp: 16   Temp: 96.5 °F (35.8 °C)   SpO2:      Pulse  Av.7  Min: 78  Max: 98  Systolic (24hrs), Av , Min:90 , Max:99     Diastolic (24hrs), Av, Min:53, Max:60    Temp (24hrs), Av.1 °F (36.7 °C), Min:96.5 °F (35.8 °C), Max:99.1 °F (37.3 °C)      Lab Results   Component Value Date     11/15/2017       A/P:   Pt indicates that she is ready to go home  Follow up in 2 weeks in neurosurgery PA clinic

## 2017-11-27 ENCOUNTER — TELEPHONE (OUTPATIENT)
Dept: NEUROSURGERY | Facility: CLINIC | Age: 37
End: 2017-11-27

## 2017-12-06 ENCOUNTER — TELEPHONE (OUTPATIENT)
Dept: NEUROSURGERY | Facility: CLINIC | Age: 37
End: 2017-12-06

## 2017-12-06 NOTE — TELEPHONE ENCOUNTER
Provider:  Jeremiah  Caller: Mateusz   Phone #:  [phone]  Surgery:  ACDF  Surgery Date:  11/21/17  Last visit:   11/13/17  Next visit: 12/11/17    JOSEFINA:         Reason for call:           Pt needs new work note faxed to employer, will call back with Fax number. Work not is signed in CMA room, please fax when pt returns call with Fax #

## 2017-12-11 NOTE — TELEPHONE ENCOUNTER
Pt called in, f/u appt was rescheduled to 12/13 so she needs the note re-written to be off until then.     She would like it faxed to:  Fax: [phone]   ATTN: Lorri Blackmon

## 2017-12-13 ENCOUNTER — OFFICE VISIT (OUTPATIENT)
Dept: NEUROSURGERY | Facility: CLINIC | Age: 37
End: 2017-12-13

## 2017-12-13 VITALS — TEMPERATURE: 97.5 F | HEIGHT: 66 IN | BODY MASS INDEX: 21.53 KG/M2 | WEIGHT: 134 LBS

## 2017-12-13 DIAGNOSIS — M54.2 NECK PAIN: Primary | ICD-10-CM

## 2017-12-13 DIAGNOSIS — M54.12 CERVICAL RADICULOPATHY: ICD-10-CM

## 2017-12-13 PROCEDURE — 99024 POSTOP FOLLOW-UP VISIT: CPT | Performed by: PHYSICIAN ASSISTANT

## 2017-12-13 RX ORDER — OMEPRAZOLE 40 MG/1
CAPSULE, DELAYED RELEASE ORAL
COMMUNITY
Start: 2017-11-13 | End: 2019-05-16

## 2017-12-13 RX ORDER — GABAPENTIN 300 MG/1
300 CAPSULE ORAL 3 TIMES DAILY
Qty: 90 CAPSULE | Refills: 2 | OUTPATIENT
Start: 2017-12-13 | End: 2018-01-02 | Stop reason: SDUPTHER

## 2017-12-13 RX ORDER — AMITRIPTYLINE HYDROCHLORIDE 150 MG/1
TABLET, FILM COATED ORAL
COMMUNITY
Start: 2017-11-28 | End: 2019-06-19

## 2017-12-13 RX ORDER — TIZANIDINE HYDROCHLORIDE 4 MG/1
4 CAPSULE, GELATIN COATED ORAL 3 TIMES DAILY
Qty: 60 CAPSULE | Refills: 0 | Status: SHIPPED | OUTPATIENT
Start: 2017-12-13 | End: 2018-01-02 | Stop reason: SDUPTHER

## 2017-12-13 NOTE — PROGRESS NOTES
Patient: Mateusz Jordan  : 1980  Chart #: [id number]    Date of Service: 2017    CHIEF COMPLAINT: Cervical radiculopathy status post ACDF C6-7    History of Present Illness Ms. Jordan is a very kind 37-year-old woman who presented to our office with complaints of neck and left shoulder and arm pain.  Her history is significant for left shoulder arthroscopy to repair a torn labrum.  Her studies demonstrated multilevel degenerative disc disease most significant at C5 6 and C6-7.  At C6-7 there was a lateral extruded disc fragment which extended superiorly behind the C6 body which provided clinical correlation.  Given her ongoing symptoms despite time and nonoperative measures, an ACDF at C6-C7 was elected and took place on 2017.  Surgery was without overt intraoperative complication.    Today patient is 3 weeks postop.  She reports resolution of the severe shooting pains she had prior to surgery in the left arm.  She continues to have some soreness in the left scapula and shoulder area.  She has muscle spasm in the posterior neck.  Sometimes she has some paresthesias into the thumb and index finger.  She has been using naproxen for pain control.  She has some soreness in the throat.  No swallowing difficulties.  No incisional issues.    The following portions of the patient's history were reviewed and updated as appropriate: allergies, current medications, past family history, past medical history, past social history, past surgical history and problem list.    Review of Systems   Constitutional: Negative for activity change, appetite change, chills, diaphoresis, fatigue, fever and unexpected weight change.   HENT: Positive for congestion and sinus pressure. Negative for dental problem, drooling, ear discharge, ear pain, facial swelling, hearing loss, mouth sores, nosebleeds, postnasal drip, rhinorrhea, sneezing, sore throat, tinnitus, trouble swallowing and voice change.    Eyes: Negative for  "photophobia, pain, discharge, redness, itching and visual disturbance.   Respiratory: Positive for cough. Negative for apnea, choking, chest tightness, shortness of breath, wheezing and stridor.    Cardiovascular: Negative for chest pain, palpitations and leg swelling.   Gastrointestinal: Negative for abdominal distention, abdominal pain, anal bleeding, blood in stool, constipation, diarrhea, nausea, rectal pain and vomiting.   Endocrine: Negative for cold intolerance, heat intolerance, polydipsia, polyphagia and polyuria.   Genitourinary: Negative for decreased urine volume, difficulty urinating, dysuria, enuresis, flank pain, frequency, genital sores, hematuria and urgency.   Musculoskeletal: Positive for back pain and neck pain. Negative for arthralgias, gait problem, joint swelling, myalgias and neck stiffness.   Skin: Negative for color change, pallor, rash and wound.   Allergic/Immunologic: Negative for environmental allergies, food allergies and immunocompromised state.   Neurological: Positive for headaches. Negative for dizziness, tremors, seizures, syncope, facial asymmetry, speech difficulty, weakness, light-headedness and numbness.   Hematological: Negative for adenopathy. Does not bruise/bleed easily.   Psychiatric/Behavioral: Negative for agitation, behavioral problems, confusion, decreased concentration, dysphoric mood, hallucinations, self-injury, sleep disturbance and suicidal ideas. The patient is not nervous/anxious and is not hyperactive.    All other systems reviewed and are negative.      Objective   Vital Signs: Temperature 97.5 °F (36.4 °C), temperature source Temporal Artery , height 167.6 cm (65.98\"), weight 60.8 kg (134 lb), not currently breastfeeding.  Physical Exam   Constitutional: She appears well-developed and well-nourished. No distress.   HENT:   Head: Normocephalic and atraumatic.   Skin:   Anterior neck incision is well-healed.   Psychiatric: She has a normal mood and affect. Her " behavior is normal. Thought content normal.   Nursing note and vitals reviewed.    Assessment/Plan   Medical Decision Making: Ms. Worley is 3 weeks status post ACDF C6-7 and is doing quite well.  Her incision has healed nicely and we have discussed further wound care instructions.  I prescribed a muscle relaxer to help with some of the posterior neck pain she has been experiencing, particularly at nighttime.  She will continue Neurontin.  We may be able to step down on this in the future.  I will see her in follow-up in 3-4 weeks.               FELIX Haq-C  Patient Care Team:  FELIX Kim as PCP - General (Physician Assistant)  Jack Noel DC (Chiropractic Medicine)

## 2018-01-02 DIAGNOSIS — M54.2 NECK PAIN: ICD-10-CM

## 2018-01-02 RX ORDER — TIZANIDINE HYDROCHLORIDE 4 MG/1
CAPSULE, GELATIN COATED ORAL
Qty: 60 CAPSULE | Refills: 0 | Status: CANCELLED | OUTPATIENT
Start: 2018-01-02

## 2018-01-02 RX ORDER — TIZANIDINE HYDROCHLORIDE 4 MG/1
4 CAPSULE, GELATIN COATED ORAL 3 TIMES DAILY
Qty: 60 CAPSULE | Refills: 0 | Status: SHIPPED | OUTPATIENT
Start: 2018-01-02 | End: 2018-07-18 | Stop reason: SDUPTHER

## 2018-01-02 RX ORDER — GABAPENTIN 300 MG/1
CAPSULE ORAL
Qty: 90 CAPSULE | Refills: 0 | Status: SHIPPED | OUTPATIENT
Start: 2018-01-02 | End: 2018-02-09 | Stop reason: SDUPTHER

## 2018-01-02 NOTE — TELEPHONE ENCOUNTER
Provider:  Jeremiah  Caller: Meghna   Surgery:  ACDF  Surgery Date:  11/21/17  Last visit:   12/11/17  Next visit: hannah ANNAPER:         11/13/2017 Gabapentin 300MG 1980 90 30 Andrew Daniels [facility] [place] [id number]  11/22/2017 Hydrocodone/Acetaminophen  325MG/7.5MG  1978 60 10 Andrew Daniels [facility]  [place] [address]  12/13/2017 Gabapentin 300MG 1978 90 30 Andrew Daniels [facility]  [place] [id number]    Reason for call:         Automated refill requests.

## 2018-01-02 NOTE — TELEPHONE ENCOUNTER
Rx has been called in.  Pt aware.  Pt stated she spoke with Alba Bui and she now has a f/u appt for Monday.

## 2018-01-02 NOTE — TELEPHONE ENCOUNTER
Medications called in-     PT not aware yet-      Had another rVM on clinical line requsting RTW slip dated till 01/09/17- pt has f/u on Monday 01/08/17.     She would like it faxed to:  Fax: [phone]   ATTN: Lorri Blackmon    I will created note- just need approval.

## 2018-01-05 ENCOUNTER — DOCUMENTATION (OUTPATIENT)
Dept: NEUROSURGERY | Facility: CLINIC | Age: 38
End: 2018-01-05

## 2018-01-05 ENCOUNTER — TELEPHONE (OUTPATIENT)
Dept: NEUROSURGERY | Facility: CLINIC | Age: 38
End: 2018-01-05

## 2018-01-05 NOTE — TELEPHONE ENCOUNTER
Provider:  Jeremiah  Caller: Mateusz  Phone #:  [phone]  Surgery:  ACDF  Surgery Date:  11/21/17  Last visit:   12/13/17  Next visit: 1/8/18    JOSEFINA:         Reason for call:             Pt is requesting work note for Mondays visit ahead of time. Is this something that we can provide or is it needed to be obtained at checkout on day of appt?

## 2018-01-08 ENCOUNTER — OFFICE VISIT (OUTPATIENT)
Dept: NEUROSURGERY | Facility: CLINIC | Age: 38
End: 2018-01-08

## 2018-01-08 VITALS
TEMPERATURE: 98.4 F | WEIGHT: 133.4 LBS | HEIGHT: 66 IN | DIASTOLIC BLOOD PRESSURE: 70 MMHG | SYSTOLIC BLOOD PRESSURE: 116 MMHG | BODY MASS INDEX: 21.44 KG/M2

## 2018-01-08 DIAGNOSIS — M54.12 CERVICAL RADICULOPATHY: Primary | ICD-10-CM

## 2018-01-08 PROCEDURE — 99024 POSTOP FOLLOW-UP VISIT: CPT | Performed by: PHYSICIAN ASSISTANT

## 2018-01-08 RX ORDER — CYCLOBENZAPRINE HCL 10 MG
10 TABLET ORAL 2 TIMES DAILY PRN
Qty: 40 TABLET | Refills: 1 | Status: SHIPPED | OUTPATIENT
Start: 2018-01-08 | End: 2018-07-18 | Stop reason: ALTCHOICE

## 2018-01-08 NOTE — PROGRESS NOTES
Subjective     Chief Complaint:Mateusz Jordan is a 37 y.o. female here for a second follow up after C6/7 ACDF on 11/21/17    History of Present Illness  Ms. Jordan is a 38yo F who presented to our office with complaints of neck pain radiating into her left shoulder and arm.  Her history is significant for left shoulder arthroscopy to repair a torn labrum.  Her studies demonstrated multilevel degenerative disc disease most significant at C5 6 and C6-7.  At C6-7 there was a lateral extruded disc fragment which extended superiorly behind the C6 body which provided clinical correlation.  Given her ongoing symptoms despite time and nonoperative measures, an ACDF at C6-C7 was elected and took place on 11/21/2017.  Surgery was without overt intraoperative complication.  She has had good relief of her arm pain, but notes that her migraines have been worse.  She has been having muscle spasms between her shoulder blades and was started on zanaflex at hr last visit, which she states have not been particularly helpful.  She is wanting to start physical therapy for her left shoulder, which has been on hold while healing from her ACDF. She has not had any difficulty with her incision. She notes some dizziness when going from sitting to standing, which is mild, but no other symptoms.     The following portions of the patient's history were reviewed and updated as appropriate: allergies, current medications, past family history, past medical history, past social history, past surgical history and problem list.    Family history:   Family History   Problem Relation Age of Onset   • No Known Problems Mother    • Throat cancer Father        Social history:   Social History     Social History   • Marital status:      Spouse name: N/A   • Number of children: N/A   • Years of education: N/A     Occupational History   • Not on file.     Social History Main Topics   • Smoking status: Former Smoker     Packs/day: 0.50     Types:  Cigarettes   • Smokeless tobacco: Never Used      Comment: QUIT 1 WEEK   • Alcohol use No   • Drug use: No   • Sexual activity: Defer     Other Topics Concern   • Not on file     Social History Narrative       Review of Systems   Constitutional: Negative for activity change, appetite change, chills, diaphoresis, fatigue, fever and unexpected weight change.   HENT: Negative for congestion, dental problem, drooling, ear discharge, ear pain, facial swelling, hearing loss, mouth sores, nosebleeds, postnasal drip, rhinorrhea, sinus pressure, sneezing, sore throat, tinnitus, trouble swallowing and voice change.    Eyes: Negative for photophobia, pain, discharge, redness, itching and visual disturbance.   Respiratory: Negative for apnea, cough, choking, chest tightness, shortness of breath, wheezing and stridor.    Cardiovascular: Negative for chest pain, palpitations and leg swelling.   Gastrointestinal: Negative for abdominal distention, abdominal pain, anal bleeding, blood in stool, constipation, diarrhea, nausea, rectal pain and vomiting.   Endocrine: Negative for cold intolerance, heat intolerance, polydipsia, polyphagia and polyuria.   Genitourinary: Negative for decreased urine volume, difficulty urinating, dysuria, enuresis, flank pain, frequency, genital sores, hematuria and urgency.   Musculoskeletal: Positive for neck pain. Negative for arthralgias, back pain, gait problem, joint swelling, myalgias and neck stiffness.   Skin: Negative for color change, pallor, rash and wound.   Allergic/Immunologic: Negative for environmental allergies, food allergies and immunocompromised state.   Neurological: Positive for dizziness and headaches. Negative for tremors, seizures, syncope, facial asymmetry, speech difficulty, weakness, light-headedness and numbness.   Hematological: Negative for adenopathy. Does not bruise/bleed easily.   Psychiatric/Behavioral: Negative for agitation, behavioral problems, confusion, decreased  "concentration, dysphoric mood, hallucinations, self-injury, sleep disturbance and suicidal ideas. The patient is not nervous/anxious and is not hyperactive.    All other systems reviewed and are negative.      Objective   Blood pressure 116/70, temperature 98.4 °F (36.9 °C), temperature source Temporal Artery , height 167.6 cm (65.98\"), weight 60.5 kg (133 lb 6.4 oz), not currently breastfeeding.  Body mass index is 21.54 kg/(m^2).    Physical Exam   Constitutional: She is oriented to person, place, and time. She appears well-developed and well-nourished. No distress.   HENT:   Head: Normocephalic and atraumatic.   Eyes: EOM are normal. Pupils are equal, round, and reactive to light.   Neck: Normal range of motion.   Cervical incision is well closed and healed     Pulmonary/Chest: Effort normal. No respiratory distress.   Musculoskeletal: Normal range of motion.   Neurological: She is alert and oriented to person, place, and time. She has normal reflexes. No cranial nerve deficit.   Skin: Skin is warm and dry.         Assessment/Plan   Ms. Jordan is doing well, overall. We will switch her from zanaflex to flexeril for muscle spasms. We recommend that she follow up with her PCP for her migraines.  She would like to return to work in the next week or so, and we will release her with no restrictions.  She is encouraged to start her PT for her shoulder. We will plan to see her back in about 4 weeks with xrays for what we hope will be a final follow up    Mateusz was seen today for post-op.    Diagnoses and all orders for this visit:    Cervical radiculopathy  -     XR spine cervical 2 or 3 vw; Future    Other orders  -     cyclobenzaprine (FLEXERIL) 10 MG tablet; Take 1 tablet by mouth 2 (Two) Times a Day As Needed for Muscle Spasms.        Return in about 4 weeks (around 2/5/2018).      "

## 2018-02-09 ENCOUNTER — DOCUMENTATION (OUTPATIENT)
Dept: NEUROSURGERY | Facility: CLINIC | Age: 38
End: 2018-02-09

## 2018-02-09 ENCOUNTER — OFFICE VISIT (OUTPATIENT)
Dept: NEUROSURGERY | Facility: CLINIC | Age: 38
End: 2018-02-09

## 2018-02-09 VITALS
TEMPERATURE: 96.6 F | HEIGHT: 66 IN | HEART RATE: 64 BPM | WEIGHT: 133 LBS | SYSTOLIC BLOOD PRESSURE: 104 MMHG | BODY MASS INDEX: 21.38 KG/M2 | DIASTOLIC BLOOD PRESSURE: 66 MMHG

## 2018-02-09 DIAGNOSIS — M54.12 CERVICAL RADICULOPATHY: Primary | ICD-10-CM

## 2018-02-09 DIAGNOSIS — M54.2 NECK PAIN: ICD-10-CM

## 2018-02-09 PROCEDURE — 99024 POSTOP FOLLOW-UP VISIT: CPT | Performed by: PHYSICIAN ASSISTANT

## 2018-02-09 RX ORDER — GABAPENTIN 300 MG/1
300 CAPSULE ORAL 2 TIMES DAILY
Qty: 60 CAPSULE | Refills: 0 | OUTPATIENT
Start: 2018-02-09 | End: 2018-03-11

## 2018-02-09 NOTE — PROGRESS NOTES
"Subjective     Chief Complaint:  Mateusz Jordan is a 37 y.o. female here today for follow up after ACDF at C6-7 on 11/21/18    History of Present Illness  Ms. Jordan is a 36yo F who presented to our office with complaints of neck pain radiating into her left shoulder and arm.  Her history is significant for left shoulder arthroscopy to repair a torn labrum.  Her studies demonstrated multilevel degenerative disc disease most significant at C5 6 and C6-7.  At C6-7 there was a lateral extruded disc fragment which extended superiorly behind the C6 body which provided clinical correlation.  After failing conservative therapy, an ACDF at C6-C7 was scheduled for 11/21/2017.  Surgery was without overt intraoperative complication.    She has had very good relief of her arm pain. She has been getting muscle spasms between her shoulder blades which were also leading to tension headaches and prompting migraines. At her last visit, she was switched from zanaflex to flexeril, but this has made her too sleepy, so she has switched back.     She has resumed physical therapy for her underlying left shoulder issues and has been going twice a week for about three weeks now.  She has orthpedic care in place for her shoulder.      Overall, she is doing well.  She is taking gabapentin 300mg BID, but is no longer needing any narcotic pain medicine.  She has returned to work, and has actually started some new responsibilities there, which require a steep learning curve. She notes that she is having random \"twitching' in her hands especially and sometimes in her legs. There is no pain associated with it, but it is new and bothersome.  She does note that she has quite a bit of stress currently at work.     The following portions of the patient's history were reviewed and updated as appropriate: allergies, current medications, past family history, past medical history, past social history, past surgical history and problem list.    Family " history:   Family History   Problem Relation Age of Onset   • No Known Problems Mother    • Throat cancer Father        Social history:   Social History     Social History   • Marital status:      Spouse name: N/A   • Number of children: N/A   • Years of education: N/A     Occupational History   • Not on file.     Social History Main Topics   • Smoking status: Former Smoker     Packs/day: 0.50     Types: Cigarettes   • Smokeless tobacco: Never Used      Comment: QUIT 1 WEEK   • Alcohol use No   • Drug use: No   • Sexual activity: Defer     Other Topics Concern   • Not on file     Social History Narrative       Review of Systems   Constitutional: Negative for activity change, appetite change, chills, diaphoresis, fatigue, fever and unexpected weight change.   HENT: Negative for congestion, dental problem, drooling, ear discharge, ear pain, facial swelling, hearing loss, mouth sores, nosebleeds, postnasal drip, rhinorrhea, sinus pressure, sneezing, sore throat, tinnitus, trouble swallowing and voice change.    Eyes: Negative for photophobia, pain, discharge, redness, itching and visual disturbance.   Respiratory: Negative for apnea, cough, choking, chest tightness, shortness of breath, wheezing and stridor.    Cardiovascular: Negative for chest pain, palpitations and leg swelling.   Gastrointestinal: Negative for abdominal distention, abdominal pain, anal bleeding, blood in stool, constipation, diarrhea, nausea, rectal pain and vomiting.   Endocrine: Negative for cold intolerance, heat intolerance, polydipsia, polyphagia and polyuria.   Genitourinary: Negative for decreased urine volume, difficulty urinating, dysuria, enuresis, flank pain, frequency, genital sores, hematuria and urgency.   Musculoskeletal: Positive for neck pain and neck stiffness. Negative for arthralgias, back pain, gait problem, joint swelling and myalgias.   Skin: Negative for color change, pallor, rash and wound.   Allergic/Immunologic:  "Negative for environmental allergies, food allergies and immunocompromised state.   Neurological: Positive for numbness and headaches. Negative for dizziness, tremors, seizures, syncope, facial asymmetry, speech difficulty, weakness and light-headedness.   Hematological: Negative for adenopathy. Does not bruise/bleed easily.   Psychiatric/Behavioral: Negative for agitation, behavioral problems, confusion, decreased concentration, dysphoric mood, hallucinations, self-injury, sleep disturbance and suicidal ideas. The patient is not nervous/anxious and is not hyperactive.        Objective   Blood pressure 104/66, pulse 64, temperature 96.6 °F (35.9 °C), height 167.6 cm (65.98\"), weight 60.3 kg (133 lb), not currently breastfeeding.  Body mass index is 21.48 kg/(m^2).    Physical Exam  Constitutional: She is oriented to person, place, and time. She appears well-developed and well-nourished. No distress.   HENT:   Head: Normocephalic and atraumatic.   Eyes: EOM are normal. Pupils are equal, round, and reactive to light.   Neck: Normal range of motion.   Cervical incision is well closed and healed     Pulmonary/Chest: Effort normal. No respiratory distress.   Musculoskeletal: Normal range of motion. Slight resting tremor of bilateral hands is noted  Neurological: She is alert and oriented to person, place, and time. She has normal reflexes. No cranial nerve deficit.   Skin: Skin is warm and dry.     Assessment/Plan   Ms. Jordan is doing well, overall.  We have renewed her neurontin, which she will wean down to once a day over the next couple of weeks.  We are going to suggest a ergonomic evaluation of her workspace to help with the muscle spasms and migraines, but she has no restrictions for work at this time.  We will plan to see her back on an as-needed basis.      Mateusz was seen today for post-op.    Diagnoses and all orders for this visit:    Cervical radiculopathy    Neck pain  -     gabapentin (NEURONTIN) 300 MG " capsule; Take 1 capsule by mouth 2 (Two) Times a Day for 60 doses.        Return if symptoms worsen or fail to improve.      Xrays of the cervical spine were reviewed today, which the patient had done in [place].    AP and lateral views show very good placement of the zero profile hardware, and there is evidence of bone growth into the space, showing very good healing. The xrays were reviewed with DR. Daniels and with the patient. Her questions about them were answered.

## 2018-02-10 PROBLEM — M54.12 CERVICAL RADICULOPATHY: Status: RESOLVED | Noted: 2017-11-13 | Resolved: 2018-02-10

## 2018-02-12 ENCOUNTER — DOCUMENTATION (OUTPATIENT)
Dept: NEUROSURGERY | Facility: CLINIC | Age: 38
End: 2018-02-12

## 2018-03-02 DIAGNOSIS — G89.18 POSTOPERATIVE PAIN: Primary | ICD-10-CM

## 2018-03-02 RX ORDER — TRAMADOL HYDROCHLORIDE 50 MG/1
50 TABLET ORAL EVERY 6 HOURS PRN
Qty: 60 TABLET | Refills: 0 | OUTPATIENT
Start: 2018-03-02 | End: 2019-05-16

## 2018-03-02 NOTE — TELEPHONE ENCOUNTER
RX for tramadol has been called in-    Pt is aware, she had me call in into the [facility] in [facility] because she is staying there in [place] this evening.        -encounter closed.

## 2018-03-02 NOTE — TELEPHONE ENCOUNTER
Provider:  Jeremiah    Surgery:  ACDF  Surgery Date:  11/21/17  Last visit: 02/09/18    Next visit: NATALI ROCHA: 10/02/2017 Tramadol Hcl 50mg 1980 40 5 [facility] 40 1    11/13/2017 Gabapentin 300MG 1980 90 30 Jeremiah [facility] 2    11/22/2017 Hydrocodone/Acetaminophen  325MG/7.5MG  1980 60 10 Andrew Daniels [facility] 2    12/13/2017 Gabapentin 300MG 1980 90 30 Andrew Daniels [facility] 2    02/09/2018 Gabapentin 300MG 1980 60 30 Jeremiah Andrew [facility] 2      Reason for call: returned pt's call-   She is requesting an Rx for Tramadol 50mg, stated she has taken this in the past and had decent relief of her left shoulder pain with the medication,  -stated she has been doing her PT- neck ROM and left shoulder, started about a month ago and she is going 2x a wk, plus 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       -pt takes gabapentin 300mg BID, rotates between Flexeril 10mg QHS and Zanaflex 4mg QHS.

## 2018-05-01 RX ORDER — GABAPENTIN 300 MG/1
CAPSULE ORAL
Qty: 60 CAPSULE | Refills: 0 | OUTPATIENT
Start: 2018-05-01 | End: 2018-06-18 | Stop reason: SDUPTHER

## 2018-05-01 NOTE — TELEPHONE ENCOUNTER
Provider:  Jeremiah  Pharmacy: [facility]  Surgery:  ACDF C6/7  Surgery Date:  11/21/17  Last visit:   02/09/18  Next visit: none scheduled    JOSEFINA:   12/13/2017 Gabapentin 300MG 1980 90 30 Andrew Daniels  02/09/2018 Gabapentin 300MG 1978 60 30 Andrew Daniels  03/19/2018 Gabapentin 300MG 1980 90 30 Andrew Daniels    Reason for call:       Automated refill request from patient's pharmacy

## 2018-05-01 NOTE — TELEPHONE ENCOUNTER
Called in Gabapentin 300 mg BID #[id number] to patient's pharmacy.     Automated refill request, no further action required, closing encounter

## 2018-06-12 RX ORDER — GABAPENTIN 300 MG/1
CAPSULE ORAL
Qty: 60 CAPSULE | Refills: 0 | OUTPATIENT
Start: 2018-06-12

## 2018-06-12 NOTE — TELEPHONE ENCOUNTER
Provider:  Jeremiah  Pharmacy: [facility]  Surgery:  ACDF C6/7  Surgery Date:  11/21/17  Last visit:   02/09/18  Next visit: none scheduled    JOSEFINA:   02/09/2018 Gabapentin 300MG 1980 60 30 Andrew Daniels   03/19/2018 Gabapentin 300MG 1978 90 30 Andrew Daniels  05/01/2018 Gabapentin 300MG 1978 60 30 Andrew Daniels    Reason for call:       Automated refill request from patient's pharmacy

## 2018-06-12 NOTE — TELEPHONE ENCOUNTER
I spoke with patient and she is still taking 2 tab qd.  If she feels well she will take 1, however this goes back and forth.  She asked if she could come back in and see Marcelina.  She is having some increased pain in her spine and shoulder region.  Patient transferred to Alba Bui for scheduling.

## 2018-06-18 ENCOUNTER — OFFICE VISIT (OUTPATIENT)
Dept: NEUROSURGERY | Facility: CLINIC | Age: 38
End: 2018-06-18

## 2018-06-18 ENCOUNTER — DOCUMENTATION (OUTPATIENT)
Dept: NEUROSURGERY | Facility: CLINIC | Age: 38
End: 2018-06-18

## 2018-06-18 VITALS
WEIGHT: 143 LBS | TEMPERATURE: 97.6 F | SYSTOLIC BLOOD PRESSURE: 116 MMHG | BODY MASS INDEX: 22.98 KG/M2 | DIASTOLIC BLOOD PRESSURE: 62 MMHG | HEIGHT: 66 IN

## 2018-06-18 DIAGNOSIS — M54.2 NECK PAIN: Primary | ICD-10-CM

## 2018-06-18 DIAGNOSIS — G89.29 CHRONIC SCAPULAR PAIN: ICD-10-CM

## 2018-06-18 DIAGNOSIS — M89.8X1 CHRONIC SCAPULAR PAIN: ICD-10-CM

## 2018-06-18 PROCEDURE — 99213 OFFICE O/P EST LOW 20 MIN: CPT | Performed by: PHYSICIAN ASSISTANT

## 2018-06-18 RX ORDER — GABAPENTIN 300 MG/1
300 CAPSULE ORAL 2 TIMES DAILY
Qty: 60 CAPSULE | Refills: 0 | Status: SHIPPED | OUTPATIENT
Start: 2018-06-18 | End: 2018-07-24 | Stop reason: SDUPTHER

## 2018-06-18 NOTE — PROGRESS NOTES
"Subjective     Chief Complaint:  Mateusz Jordan is a 38 y.o. female here today for follow up after ACDF at C6-7 on 11/21/18.    History of Present Illness    Ms. Jordan is a 36yo F who presented to our office with complaints of neck pain radiating into her left shoulder and arm.  Her history is significant for left shoulder arthroscopy to repair a torn labrum.  Her studies demonstrated multilevel degenerative disc disease most significant at C5 6 and C6-7.  At C6-7 there was a lateral extruded disc fragment which extended superiorly behind the C6 body which provided clinical correlation.  After failing conservative therapy, an ACDF at C6-C7 was scheduled for 11/21/2017.  Surgery was without overt intraoperative complication.     She initially had very good relief of her arm pain, but has continued to struggle with pain between her shoulder blades and on the left side of her neck which she describes as intermittently stabbing or numb. It has begun occasionally radiating back into her upper arm as well.    She has also struggles with tension headaches which were prompting migraines.   She has contained to take NSAIDS regularly now for several months, and does physical therapy on her own regularly as well as formal check-ins with PT about twice a month. Stretches help a little, as does ice or massage, but these offer only temporary relief and she has lingering constant pain that has been emotionally very wearing.  She states that sleeping on her left side exacerbates the pain, so she often sleeps on the sofa which \"forces\" her to sleep on her right instead.   She is still taking gabapentin twice a day, which helps with her pain. She also takes the muscle relaxant at night.  She is not on any narcotic pain medication.  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    She has not had any weakness of her hands or arms. She does occasionally note some tightness in the pectoralis muscle " on the left, especially when her left arm is hurting more.   She had a fall on 4/26; she was outside walking during her lunch hour and slipped on some barton; her legs went out in front of her and she landed on her upper back without breaking her fall.  She went to her PCP for xrays of the cervical and lumbar spines after this event. No hardware complication was noted.     The following portions of the patient's history were reviewed and updated as appropriate: allergies, current medications, past family history, past medical history, past social history, past surgical history and problem list.      Family history:   Family History   Problem Relation Age of Onset   • No Known Problems Mother    • Throat cancer Father        Social history:   Social History     Social History   • Marital status:      Spouse name: N/A   • Number of children: N/A   • Years of education: N/A     Occupational History   • Not on file.     Social History Main Topics   • Smoking status: Former Smoker     Packs/day: 0.50     Types: Cigarettes   • Smokeless tobacco: Never Used      Comment: QUIT 1 WEEK   • Alcohol use No   • Drug use: No   • Sexual activity: Defer     Other Topics Concern   • Not on file     Social History Narrative   • No narrative on file       Review of Systems   Constitutional: Negative for activity change, appetite change, chills, diaphoresis, fatigue, fever and unexpected weight change.   HENT: Positive for sinus pressure. Negative for congestion, dental problem, drooling, ear discharge, ear pain, facial swelling, hearing loss, mouth sores, nosebleeds, postnasal drip, rhinorrhea, sneezing, sore throat, tinnitus, trouble swallowing and voice change.    Eyes: Negative for photophobia, pain, discharge, redness, itching and visual disturbance.   Respiratory: Negative for apnea, cough, choking, chest tightness, shortness of breath, wheezing and stridor.    Cardiovascular: Negative for chest pain, palpitations and leg  "swelling.   Gastrointestinal: Negative for abdominal distention, abdominal pain, anal bleeding, blood in stool, constipation, diarrhea, nausea, rectal pain and vomiting.   Endocrine: Negative for cold intolerance, heat intolerance, polydipsia, polyphagia and polyuria.   Genitourinary: Negative for decreased urine volume, difficulty urinating, dysuria, enuresis, flank pain, frequency, genital sores, hematuria and urgency.   Musculoskeletal: Positive for back pain and neck stiffness. Negative for arthralgias, gait problem, joint swelling, myalgias and neck pain.   Skin: Negative for color change, pallor, rash and wound.   Allergic/Immunologic: Negative for environmental allergies, food allergies and immunocompromised state.   Neurological: Positive for headaches. Negative for dizziness, tremors, seizures, syncope, facial asymmetry, speech difficulty, weakness, light-headedness and numbness.   Hematological: Negative for adenopathy. Does not bruise/bleed easily.   Psychiatric/Behavioral: Negative for agitation, behavioral problems, confusion, decreased concentration, dysphoric mood, hallucinations, self-injury, sleep disturbance and suicidal ideas. The patient is not nervous/anxious and is not hyperactive.        Objective   Blood pressure 116/62, temperature 97.6 °F (36.4 °C), temperature source Temporal Artery , height 167.6 cm (65.98\"), weight 64.9 kg (143 lb), not currently breastfeeding.  Body mass index is 23.09 kg/m².    Physical Exam   Constitutional: She is oriented to person, place, and time. She appears well-developed and well-nourished. No distress.   HENT:   Head: Normocephalic and atraumatic.   Eyes: EOM are normal. Pupils are equal, round, and reactive to light.   Neck: Normal range of motion. Neck supple. No thyromegaly present.       Tightness in the musculature of the neck, especially on the left compared to the right, but full active and passive ROM   Pulmonary/Chest: Effort normal. No respiratory " distress.           Musculoskeletal: Normal range of motion. She exhibits tenderness (tenderness in muscles of the left side of the neck). She exhibits no edema.   Neurological: She is alert and oriented to person, place, and time. No cranial nerve deficit. She displays no Babinski's sign on the right side. She displays no Babinski's sign on the left side.   Reflex Scores:       Tricep reflexes are 1+ on the right side and 2+ on the left side.       Bicep reflexes are 1+ on the right side and 2+ on the left side.       Patellar reflexes are 1+ on the right side and 3+ on the left side.       Achilles reflexes are 1+ on the right side and 3+ on the left side.  No Burroughs's     Cervical xrays dated 4/27/18, done in [place]:  Hardware intact from C6/7 fusion, but progression of anterior osteophyte and degenerative changes especially at C5/6    Assessment/Plan   Ms. Jordan has pursued conservative treatment of NSAIDS and physical therapy since she was last seen here in March. She has had very focal and persistent pain throughout that time despite her efforts. We will obtain a cervical and thoracic MRI for review and renew her gabapentin.  Some of her symptoms may be cardiac in nature, but she has no personal or strong family history of this, so we will start with the follow up MRI. We will see her back once these have been completed.     Mateusz was seen today for neck pain and shoulder pain.    Diagnoses and all orders for this visit:    Neck pain  -     MRI Cervical Spine With & Without Contrast; Future  -     MRI Thoracic Spine Without Contrast; Future    Chronic scapular pain  -     MRI Cervical Spine With & Without Contrast; Future  -     MRI Thoracic Spine Without Contrast; Future    Other orders  -     gabapentin (NEURONTIN) 300 MG capsule; Take 1 capsule by mouth 2 (Two) Times a Day.        Return in about 2 weeks (around 7/2/2018).

## 2018-07-02 ENCOUNTER — HOSPITAL ENCOUNTER (OUTPATIENT)
Dept: MRI IMAGING | Facility: HOSPITAL | Age: 38
Discharge: HOME | End: 2018-07-02

## 2018-07-02 ENCOUNTER — HOSPITAL ENCOUNTER (OUTPATIENT)
Dept: MRI IMAGING | Facility: HOSPITAL | Age: 38
Discharge: HOME | End: 2018-07-02
Admitting: PHYSICIAN ASSISTANT

## 2018-07-02 DIAGNOSIS — M89.8X1 CHRONIC SCAPULAR PAIN: ICD-10-CM

## 2018-07-02 DIAGNOSIS — G89.29 CHRONIC SCAPULAR PAIN: ICD-10-CM

## 2018-07-02 DIAGNOSIS — M54.2 NECK PAIN: ICD-10-CM

## 2018-07-02 PROCEDURE — 72156 MRI NECK SPINE W/O & W/DYE: CPT

## 2018-07-02 PROCEDURE — 72146 MRI CHEST SPINE W/O DYE: CPT

## 2018-07-02 PROCEDURE — 0 GADOBENATE DIMEGLUMINE 529 MG/ML SOLUTION: Performed by: PHYSICIAN ASSISTANT

## 2018-07-02 PROCEDURE — A9577 INJ MULTIHANCE: HCPCS | Performed by: PHYSICIAN ASSISTANT

## 2018-07-02 RX ADMIN — GADOBENATE DIMEGLUMINE 13 ML: 529 INJECTION, SOLUTION INTRAVENOUS at 12:30

## 2018-07-18 RX ORDER — TIZANIDINE HYDROCHLORIDE 4 MG/1
CAPSULE, GELATIN COATED ORAL
Qty: 60 CAPSULE | Refills: 0 | Status: SHIPPED | OUTPATIENT
Start: 2018-07-18 | End: 2018-10-03 | Stop reason: SDUPTHER

## 2018-07-18 NOTE — TELEPHONE ENCOUNTER
Provider:  Jeremiah  Pharmacy: [facility]  Surgery:  ACDF C6/7  Surgery Date:  11/21/17  Last visit:   06/18/18  Next visit: none scheduled    Reason for call:       Automated refill request from patient's pharmacy

## 2018-07-24 RX ORDER — GABAPENTIN 300 MG/1
CAPSULE ORAL
Qty: 60 CAPSULE | Refills: 2 | Status: SHIPPED | OUTPATIENT
Start: 2018-07-24 | End: 2019-05-16

## 2018-07-24 NOTE — TELEPHONE ENCOUNTER
Called in Gabapentin 300 mg BID #[id number] 2RF to patient's pharmacy.     Automated refill request, no further action required, closing encounter

## 2018-07-24 NOTE — TELEPHONE ENCOUNTER
Provider: Jeremiah   Caller: pharmacy    Phone #: [phone]   Surgery: CERVICAL DISCECTOMY ANTERIOR FUSION WITH INSTRUMENTATION C6-7   Surgery Date:  11/21/17  Last visit:  6/18/18  Next visit: n/a    JOSEFINA:   03/19/2018 Gabapentin 300MG 1978 90 30 PEYMAN Daniels, [facility] 2  04/27/2018 Acetaminophen/Codeine 300MG/30MG 1978 30 5 GO Cuenca Paul [facility],[place] [address] 2  05/01/2018 Gabapentin 300MG 1978 60 30 PEYMAN Daniels, [facility] 2   06/18/2018 Gabapentin 300MG 1978 60 30 PEYMAN Daniels, [facility] 2         Reason for call:         Requested Prescriptions     Pending Prescriptions Disp Refills   • gabapentin (NEURONTIN) 300 MG capsule [Pharmacy Med Name: GABAPENTIN 300 MG CAPSULE 300 CAP] 60 capsule 0     Sig: TAKE ONE CAPSULE BY MOUTH TWO TIMES A DAY

## 2018-10-03 RX ORDER — TIZANIDINE HYDROCHLORIDE 4 MG/1
CAPSULE, GELATIN COATED ORAL
Qty: 60 CAPSULE | Refills: 0 | Status: SHIPPED | OUTPATIENT
Start: 2018-10-03 | End: 2018-12-04 | Stop reason: SDUPTHER

## 2018-10-03 NOTE — TELEPHONE ENCOUNTER
Provider:  Jeremiah  Caller:  Automated refill request  Surgery:  C6/7 ACDF  Surgery Date:  11/21/17  Last visit:  06/12/18  Next visit:  06/18/18    Reason for call:         Requested Prescriptions     Pending Prescriptions Disp Refills   • TiZANidine (ZANAFLEX) 4 MG capsule [Pharmacy Med Name: TIZANIDINE HCL 4 MG CAP 4 CAP] 60 capsule 0     Sig: TAKE ONE CAPSULE BY MOUTH THREE TIMES A DAY

## 2018-10-12 ENCOUNTER — TELEPHONE (OUTPATIENT)
Dept: NEUROSURGERY | Facility: CLINIC | Age: 38
End: 2018-10-12

## 2018-10-12 NOTE — TELEPHONE ENCOUNTER
Provider:  Jeremiah  Caller: pharmacy-fax  Time of call:   11:50  Phone #:  [phone]  Surgery:  ACDF C6-C7  Surgery Date:  11-21-17  Last visit:   06/18/18  Next visit: none    JOSEFINA:         Reason for call:     Patient requests refill on Fioricet 50/325 mg.  I do not see this on the medication list.  Originally the pharmacy had faxed it to Dr. Benton Cuenca's office.    They have also requested a refill on Tylenol # 3.

## 2018-10-12 NOTE — TELEPHONE ENCOUNTER
Pt. Does not have a FU appointment.   - If she would like refills of the medications, then she needs to contact her PCP's office.    Thanks,    SO

## 2018-10-15 RX ORDER — ACETAMINOPHEN AND CODEINE PHOSPHATE 300; 30 MG/1; MG/1
1 TABLET ORAL EVERY 4 HOURS PRN
Qty: 45 TABLET | Refills: 0 | Status: SHIPPED | OUTPATIENT
Start: 2018-10-15 | End: 2019-06-19

## 2018-10-16 NOTE — TELEPHONE ENCOUNTER
Pt aware.  I let her know this is her last RF-others will need to come from PCP.  Once RX is signed pt will like for it to be mailed- address verified.

## 2018-10-18 NOTE — TELEPHONE ENCOUNTER
Cyril from [facility] called and wanted to f/u on the status of the refill. I let him know the plan and that the script was placed in the mail. The pt's  was in the pharmacy today inquiring about the refill stating his wife was still in considerable pain.     The pharmacy would like to know if we are willing to call in a few days worth of this medicine until the pt receives her last script in the mail?    Please advise.

## 2018-12-04 RX ORDER — TIZANIDINE HYDROCHLORIDE 4 MG/1
CAPSULE, GELATIN COATED ORAL
Qty: 60 CAPSULE | Refills: 0 | Status: SHIPPED | OUTPATIENT
Start: 2018-12-04 | End: 2019-05-16

## 2018-12-04 RX ORDER — GABAPENTIN 300 MG/1
CAPSULE ORAL
Qty: 60 CAPSULE | Refills: 2 | OUTPATIENT
Start: 2018-12-04

## 2018-12-04 NOTE — TELEPHONE ENCOUNTER
Pt aware VIA vm that her RX was sent to pharmacy.     I also mentioned a few Ortho doctors within [facility].

## 2018-12-04 NOTE — TELEPHONE ENCOUNTER
Provider: Jeremiah   Caller: pharmacy  Time of call:     Phone #:  [phone]  Surgery: CERVICAL DISCECTOMY ANTERIOR FUSION WITH INSTRUMENTATION C6-7    Surgery Date: 11/21/17   Last visit:  6/18/18   Next visit: was supposed to return in July    JOSEFINA:    08/24/2018 Gabapentin 300MG 1978 60 30 CORNELL MICHAEL  [facility] 2  10/18/2018 Acetaminophen/Codeine 300MG/30MG 1978 12 2 CORNELL MICHAEL  [facility] 27 2  10/18/2018 Tramadol Hcl 50MG 1978 30 4 CODEY MELENDREZ [id number]  [facility] 38 1  11/06/2018 Acetaminophen/Codeine 300MG/30MG 1978 45 7 CORNELL MICHAEL  [facility] 29 2  11/19/2018 Gabapentin 300MG 1978 60 30 CORNELL MICHAEL  [facility] 2       Reason for call:       Requested Prescriptions     Pending Prescriptions Disp Refills   • gabapentin (NEURONTIN) 300 MG capsule [Pharmacy Med Name: GABAPENTIN 300 MG CAPSULE 300 CAP] 60 capsule 2     Sig: TAKE ONE CAPSULE BY MOUTH TWO TIMES A DAY

## 2018-12-04 NOTE — TELEPHONE ENCOUNTER
Provider:  Jeremiah  Caller: patient  Time of call:  11:45   Phone #:  [phone]  Surgery: ACDF C6-C7   Surgery Date:  11/21/17  Last visit:   06/18/18  Next visit: none    JOSEFINA:         Reason for call:   Patient would like to know if there is a particular orthopedic that Dr. Daniels prefers with Buddhism.  She is having issues with her shoulders.

## 2018-12-07 ENCOUNTER — TELEPHONE (OUTPATIENT)
Dept: NEUROSURGERY | Facility: CLINIC | Age: 38
End: 2018-12-07

## 2018-12-07 DIAGNOSIS — G89.29 CHRONIC SCAPULAR PAIN: Primary | ICD-10-CM

## 2018-12-07 DIAGNOSIS — M89.8X1 CHRONIC SCAPULAR PAIN: Primary | ICD-10-CM

## 2018-12-07 NOTE — TELEPHONE ENCOUNTER
Pt called asking for a referral to get her shoulder looked at.  She has been seeing someone but wants a second opinion.  I have put in a referral to Dr. Mckay.

## 2019-04-01 ENCOUNTER — TRANSCRIBE ORDERS (OUTPATIENT)
Dept: ADMINISTRATIVE | Facility: HOSPITAL | Age: 39
End: 2019-04-01

## 2019-04-01 DIAGNOSIS — M54.12 CERVICAL RADICULOPATHY: Primary | ICD-10-CM

## 2019-04-04 ENCOUNTER — HOSPITAL ENCOUNTER (OUTPATIENT)
Dept: MRI IMAGING | Facility: HOSPITAL | Age: 39
Discharge: HOME | End: 2019-04-04
Admitting: ANESTHESIOLOGY

## 2019-04-04 DIAGNOSIS — M54.12 CERVICAL RADICULOPATHY: ICD-10-CM

## 2019-04-04 PROCEDURE — A9577 INJ MULTIHANCE: HCPCS | Performed by: ANESTHESIOLOGY

## 2019-04-04 PROCEDURE — 0 GADOBENATE DIMEGLUMINE 529 MG/ML SOLUTION: Performed by: ANESTHESIOLOGY

## 2019-04-04 PROCEDURE — 72156 MRI NECK SPINE W/O & W/DYE: CPT

## 2019-04-04 RX ADMIN — GADOBENATE DIMEGLUMINE 13 ML: 529 INJECTION, SOLUTION INTRAVENOUS at 13:34

## 2019-04-10 ENCOUNTER — OFFICE VISIT (OUTPATIENT)
Dept: NEUROSURGERY | Facility: CLINIC | Age: 39
End: 2019-04-10

## 2019-04-10 ENCOUNTER — HOSPITAL ENCOUNTER (OUTPATIENT)
Dept: GENERAL RADIOLOGY | Facility: HOSPITAL | Age: 39
Discharge: HOME | End: 2019-04-10
Admitting: NEUROLOGICAL SURGERY

## 2019-04-10 VITALS
DIASTOLIC BLOOD PRESSURE: 72 MMHG | SYSTOLIC BLOOD PRESSURE: 102 MMHG | TEMPERATURE: 97.5 F | WEIGHT: 144.2 LBS | HEIGHT: 65 IN | BODY MASS INDEX: 24.03 KG/M2

## 2019-04-10 DIAGNOSIS — Z98.1 STATUS POST CERVICAL SPINAL FUSION: ICD-10-CM

## 2019-04-10 DIAGNOSIS — M54.2 NECK PAIN: ICD-10-CM

## 2019-04-10 DIAGNOSIS — G89.29 CHRONIC SCAPULAR PAIN: Primary | ICD-10-CM

## 2019-04-10 DIAGNOSIS — M89.8X1 CHRONIC SCAPULAR PAIN: Primary | ICD-10-CM

## 2019-04-10 DIAGNOSIS — Z72.0 TOBACCO ABUSE: ICD-10-CM

## 2019-04-10 PROCEDURE — 72052 X-RAY EXAM NECK SPINE 6/>VWS: CPT

## 2019-04-10 PROCEDURE — 99214 OFFICE O/P EST MOD 30 MIN: CPT | Performed by: NEUROLOGICAL SURGERY

## 2019-04-10 RX ORDER — ZONISAMIDE 100 MG/1
CAPSULE ORAL
Refills: 0 | COMMUNITY
Start: 2019-03-21 | End: 2019-05-16

## 2019-04-10 NOTE — PROGRESS NOTES
Subjective     Chief Complaint: Follow-up ACDF, recurrent left arm and shoulder pain    Patient ID: Mateusz Jordan is a 39 y.o. female is here today for follow-up.    History of Present Illness    This is a 39-year-old woman who underwent an uncomplicated C6-7 ACDF in November 2017.  She got somewhat better after her surgery, but over the course of the last few months has experienced the recurrence of some left arm, left neck, and left shoulder pain.  The pain does not go below the elbow into the hand anymore.  She states that it is similar to her pain prior to the surgery.    The patient reports that she is still actively smoking, and never really stopped after her ACDF.    The following portions of the patient's history were reviewed and updated as appropriate: allergies, current medications, past family history, past medical history, past social history, past surgical history and problem list.    Family history:   Family History   Problem Relation Age of Onset   • No Known Problems Mother    • Throat cancer Father        Social history:   Social History     Socioeconomic History   • Marital status:      Spouse name: Not on file   • Number of children: Not on file   • Years of education: Not on file   • Highest education level: Not on file   Tobacco Use   • Smoking status: Former Smoker     Packs/day: 0.50     Types: Cigarettes   • Smokeless tobacco: Never Used   • Tobacco comment: QUIT 1 WEEK   Substance and Sexual Activity   • Alcohol use: No   • Drug use: No   • Sexual activity: Defer       Review of Systems   Constitutional: Negative for activity change, appetite change, chills, diaphoresis, fatigue, fever and unexpected weight change.   HENT: Positive for sinus pressure. Negative for congestion, dental problem, drooling, ear discharge, ear pain, facial swelling, hearing loss, mouth sores, nosebleeds, postnasal drip, rhinorrhea, sneezing, sore throat, tinnitus, trouble swallowing and voice change.   "  Eyes: Negative for photophobia, pain, discharge, redness, itching and visual disturbance.   Respiratory: Negative for apnea, cough, choking, chest tightness, shortness of breath, wheezing and stridor.    Cardiovascular: Negative for chest pain, palpitations and leg swelling.   Gastrointestinal: Positive for constipation. Negative for abdominal distention, abdominal pain, anal bleeding, blood in stool, diarrhea, nausea, rectal pain and vomiting.   Endocrine: Negative for cold intolerance, heat intolerance, polydipsia, polyphagia and polyuria.   Genitourinary: Positive for pelvic pain. Negative for decreased urine volume, difficulty urinating, dysuria, enuresis, flank pain, frequency, genital sores, hematuria and urgency.   Musculoskeletal: Positive for back pain and neck pain. Negative for arthralgias, gait problem, joint swelling, myalgias and neck stiffness.   Skin: Negative for color change, pallor, rash and wound.   Allergic/Immunologic: Negative for environmental allergies, food allergies and immunocompromised state.   Neurological: Positive for headaches. Negative for dizziness, tremors, seizures, syncope, facial asymmetry, speech difficulty, weakness, light-headedness and numbness.   Hematological: Negative for adenopathy. Does not bruise/bleed easily.   Psychiatric/Behavioral: Negative for agitation, behavioral problems, confusion, decreased concentration, dysphoric mood, hallucinations, self-injury, sleep disturbance and suicidal ideas. The patient is not nervous/anxious and is not hyperactive.    All other systems reviewed and are negative.      Objective   Blood pressure 102/72, temperature 97.5 °F (36.4 °C), temperature source Temporal, height 165.1 cm (65\"), weight 65.4 kg (144 lb 3.2 oz), not currently breastfeeding.  Body mass index is 24 kg/m².    Physical Exam   Constitutional: She is oriented to person, place, and time. She appears well-developed and well-nourished. No distress.   HENT:   Head: " Normocephalic and atraumatic.   Pulmonary/Chest: Effort normal.   Neurological: She is alert and oriented to person, place, and time. No cranial nerve deficit.   Skin: Skin is warm and dry. She is not diaphoretic.   Psychiatric: She has a normal mood and affect.         Assessment/Plan     Independent Review of Radiographic Studies:      Available for my review is a MRI of the cervical spine which was performed on 4/10/2019.  Available for comparison studies are her MRI of the cervical spine from 10/20/17 and 2/7/18.  The most recent MRI demonstrates a significant amount of susceptibility artifact at the C6-7 disc space.  There is a disc osteophyte complex at C5-6 which appears to be essentially unchanged in comparison to her study from October 2017.  There is some moderate lateral recess stenosis, and possible impingement of the exiting C6 nerve root on the right side.    Medical Decision Making:      It is certainly possible that she has a left-sided C6 radiculopathy based on her complaints and her radiographic studies, however given the fact that her symptoms have only really been present for the past few months, and her MRI is essentially unchanged from October 2017 I do not think I would offer a C5-6 ACDF at this point without some additional information.  Most notably, I am concerned about pseudoarthrosis given her ongoing tobacco abuse.  Additionally, I would like to have a therapeutic left-sided C5-6 transforaminal injection to determine if the C6 nerve root is really contributing significantly to her arm and shoulder pain.    As I stated in my initial note, she is at increased risk of poor surgical outcomes because of her multilevel degenerative disc disease and the circumstances of her initial injury.  I am also concerned about her active tobacco abuse which places her at increased risk of poor procedural outcomes as well.  I would like to see her back after her flexion-extension films of her cervical  spine and her C5-6 transforaminal injection.    Mateusz was seen today for follow-up.    Diagnoses and all orders for this visit:    Chronic scapular pain    Neck pain    Tobacco abuse    Status post cervical spinal fusion    Other orders  -     XR Spine Cervical Complete With Flex Ext; Future        Return in about 4 weeks (around 5/8/2019).        Scribed for Andrew Daniels MD by Evelyne Day CMA. 4/10/2019 10:43 AM    This document signed by MARGARETH Daniels MD April 10, 2019 10:43 AM

## 2019-05-08 ENCOUNTER — TELEPHONE (OUTPATIENT)
Dept: NEUROSURGERY | Facility: CLINIC | Age: 39
End: 2019-05-08

## 2019-05-08 DIAGNOSIS — Z98.1 STATUS POST CERVICAL SPINAL FUSION: ICD-10-CM

## 2019-05-08 DIAGNOSIS — M89.8X1 CHRONIC SCAPULAR PAIN: Primary | ICD-10-CM

## 2019-05-08 DIAGNOSIS — G89.29 CHRONIC SCAPULAR PAIN: Primary | ICD-10-CM

## 2019-05-08 DIAGNOSIS — M54.2 NECK PAIN: ICD-10-CM

## 2019-05-08 NOTE — TELEPHONE ENCOUNTER
Provider:  Jeremiah  Caller: Mateusz  Time of call:  12:04p  Phone #:  [phone]  Surgery: ACDF C6-7   Surgery Date:  11/21/17  Last visit:   4/10/19  Next visit: 4 weeks from last visit.      Reason for call:       Pt left a message stating Dr. Briseno will not do the injection that Dr. Daniels's wants performed.     Pt wants to see a different pain management doctor.     Per Dr. Briseno- He hasn't done transforaminal epidurals @ C5-6 in years since the FDA workgroup. (NOTE IN Epic)      Daniela-please see who Dr. Daniels wants pt to see?

## 2019-05-08 NOTE — TELEPHONE ENCOUNTER
I spoke with Maria De Jesus about this earlier today--we will have pt get injection by a different PM physician who is able to perform this. [facility] New referral has been entered for PM.

## 2019-05-13 NOTE — TELEPHONE ENCOUNTER
I have tried calling the patient multiple times to make her aware of the change. No luck reaching the patient. I have left several VM messages for a return call.    Thanks

## 2019-05-14 ENCOUNTER — TELEPHONE (OUTPATIENT)
Dept: PAIN MEDICINE | Facility: CLINIC | Age: 39
End: 2019-05-14

## 2019-05-15 PROBLEM — M47.22 CERVICAL SPONDYLOSIS WITH RADICULOPATHY: Status: ACTIVE | Noted: 2019-05-15

## 2019-05-15 PROBLEM — M48.02 CERVICAL STENOSIS OF SPINE: Status: ACTIVE | Noted: 2019-05-15

## 2019-05-15 PROBLEM — Z71.6 TOBACCO ABUSE COUNSELING: Status: ACTIVE | Noted: 2019-05-15

## 2019-05-15 NOTE — PROGRESS NOTES
"Chief Complaint: \"Pain in my neck, left arm and left shoulder.\"          History of Present Illness:   Patient: Ms. Mateusz Jordan, 39 y.o. female   Referring physician: Dr. Daniels   Reason for referral: Consultation for intractable chronic neck, left arm and shoulder pain.   Pain history: Patient reports a 2-year history of pain, which began after a fall. Patient underwent an uncomplicated C6-C7 ACDF in November 2017. She got somewhat better after her surgery, but over the course of the last few months has experienced the recurrence of some left arm, left neck, and left shoulder pain. Patient states that her current pain is similar to her pain prior to her surgery. Pain has progressed in intensity over the past 2 years.   Pain description: constant pain with intermittent exacerbation, described as aching, dull and sharp sensation.   Radiation of pain: The neck pain radiates into the posterior aspect of the left shoulder, left upper back, and left biceps.Patient also reports ongoing numbness to the elbow that was present before her surgery  Pain intensity today: 5/10  Average pain intensity last week: 8/10  Pain intensity ranges from: 3/10 to 10/10  Aggravating factors: Pain increases with lying on her left side, gardening, and movement of her cervical spine   Alleviating factors: Pain decreases with lying propped up with ice on neck and shoulder.     Associated symptoms:   Patient reports pain, numbness and weakness in the left upper extremity. Patient denies right-sided symptoms  Patient denies any new bladder or bowel problems.   Patient denies difficulties with her balance or recent falls   Patient reports left-sided occipital headaches lasting several hours as well as chronic migraine headaches.     Review of previous therapies and additional medical records:  Mateusz Jordan has already failed the following measures, including:   Conservative measures: oral analgesics, opioids, topical analgesics, physical " therapy, ice and TENs   Interventional measures: TPI by Dr. Briseno, Cortisone injections in shoulder- neither helpful  Surgical measures: ACDF C6-C7 with Dr. Daniels, 11/21/2017  Mateusz Jordan underwent neurosurgical consultation with Dr. Daniels on 04/10/2019, and was found to be a potential surgical candidate.  Mateusz Jordan presents with significant comorbidities including nicotine addiction, osteoarthritis, GERD, interstitial cystitis, chronic migraines, engaged in treatment.  In terms of current analgesics, Mateusz Jordan takes: Tylenol #3. Patient also takes Elavil, Wellbutrin, Topamax   I have reviewed Eric Report #[id number] consistent to medication reconciliation.     Global Pain Scale 05-16  2019                  Pain  17                 Feelings  3                 Clinical outcomes  12                 Activities  10                 GPS Total:  42                    Review of Diagnostic Studies:    EMG/NCV, 02/06/2019: Mild chronic left C5-C6 radiculopathy. No evidence of plexopathy or focal nerve entrapment. Normal axillary nerve study.   Xray Cervical Spine, 04/10/2019: AP, lateral, flexion and extension views of the cervical spine and both oblique views and odontoid view reveal anterior fusion seen of the C6/C7 level with degenerative changes seen of the C5/C6 level. There is some disc space narrowing. The facets are well aligned. Pedicles are intact. No prevertebral soft tissue swelling. Odontoid is intact and unremarkable. No significant neuroforaminal narrowing is identified. Mild posterior osteophyte formation seen on the right at the C5/C6 and C6/C7 level. Alignment stable on both flexion and extension views.  MRI Cervical Spine, 04/01/2019: Sagittal datasets reveal flattening of the cervical lordotic curvature status post ACDF C6-C7 levels unchanged in appearance from prior comparison with susceptibility artifact associated in this region. Cervicomedullary junction widely patent. No  aggressive bone marrow signal abnormality. Cervical cord and spinal canal grossly unremarkable for caliber and contour evaluation similar to prior. Axial dataset evaluation of the paraspinal soft tissues without paraspinal hematoma or soft tissue abnormality of concern including the partially visualized lung apices. Axial dataset evaluation for spinal canal or neuroforaminal patency as detailed below:  C2-C3: Minimal disc osteophyte complex without significant spinal canal or neuroforaminal stenosis.  C3-C4: Moderate disc osteophyte complex with a leftward predominance along with uncovertebral spurring and hypertrophy producing mild to moderate spinal canal stenosis with mild right and mild-to-moderate left neuroforaminal stenosis.  C4-C5: Mild-to-moderate disc osteophyte complex along with moderate uncovertebral spurring and hypertrophy producing mild spinal canal stenosis with mild left neuroforaminal stenosis.  C5-C6: Moderate-to-large disc osteophyte complex with leftward and lateral predominance producing mild-to-moderate spinal canal stenosis with moderate-to-severe left neuroforaminal stenosis.  C6-C7: Fusion level without significant spinal canal stenosis demonstrating mild-to-moderate left neuroforaminal stenosis.  C7-T1: No significant spinal canal or neuroforaminal stenosis.  Postcontrast administration sequences without abnormal intramedullary or intradural enhancement as well as lack of significant hypertrophic scarring at the fusion level.  MRI Cervical Spine, 07/02/2018: There is anterior fusion identified of the C6 and C7 levels. Degenerative change is identified of the C3-C4 and C5-C6 levels. Slight straightening of the normal lordosis of the cervical spine. The vertebral body height is preserved. Normal signal intensity is within the vertebral bodies. Craniovertebral junction is preserved. Normal signal intensity is seen within the spinal cord. No evidence of cord edema or contusion.  Facets are  well aligned. Pedicles are intact.  Axial imaging reveals evidence of a small posterior disc osteophyte complex at the C3-C4 level creating minimal mass effect anteriorly on the thecal sac with no evidence of significant central spinal canal stenosis or nerve root compromise. No neural foraminal narrowing identified. At the C5-C6 level, there is a small posterior disc osteophyte complex identified creating some mild mass effect anteriorly on the thecal sac and mild narrowing of the left neuroforamina. No definite nerve root compromise is identified. No central spinal canal stenosis. The remainder of the levels are grossly unremarkable in appearance. There is no abnormal contrast enhancement present.  MRI Thoracic Spine, 07/02/2018: The vertebral body height and signal is within normal limits. The disc spaces are preserved. Normal signal intensity is seen within the thoracic cord. Craniovertebral junction is preserved. No abnormal mass or fluid collection is seen within the paraspinal muscles. No fracture or dislocation. No central spinal canal stenosis or nerve root  compromise is identified.  Axial imaging reveals no evidence of disc protrusion, neural foraminal narrowing or nerve root compromise.  No significant disc space narrowing. No disc protrusion.    Review of Systems   Constitutional: Positive for activity change.   Musculoskeletal: Positive for neck pain.   Neurological: Positive for numbness and headaches.   All other systems reviewed and are negative.        Patient Active Problem List   Diagnosis   • Neck pain   • Chronic scapular pain   • Status post cervical spinal fusion   • Tobacco abuse   • Cervical stenosis of spine   • Cervical spondylosis with radiculopathy   • Tobacco abuse counseling   • Myofascial pain       Past Medical History:   Diagnosis Date   • Arthritis    • GERD (gastroesophageal reflux disease)    • Head ache    • Interstitial cystitis    • Migraine    • Neck pain          Past  Surgical History:   Procedure Laterality Date   • ANTERIOR CERVICAL DISCECTOMY W/ FUSION N/A 11/21/2017    Procedure: CERVICAL DISCECTOMY ANTERIOR FUSION WITH INSTRUMENTATION C6-7;  Surgeon: Andrew Daniels MD;  Location: [facility];  Service:    • APPENDECTOMY     • HYSTERECTOMY     • SHOULDER ARTHROSCOPY W/ LABRAL REPAIR Left 2017         Family History   Problem Relation Age of Onset   • No Known Problems Mother    • Throat cancer Father          Social History     Socioeconomic History   • Marital status:      Spouse name: Not on file   • Number of children: Not on file   • Years of education: Not on file   • Highest education level: Not on file   Tobacco Use   • Smoking status: Current Every Day Smoker     Packs/day: 0.50     Types: Cigarettes   • Smokeless tobacco: Never Used   • Tobacco comment: QUIT 1 WEEK   Substance and Sexual Activity   • Alcohol use: No   • Drug use: No   • Sexual activity: Defer           Current Outpatient Medications:   •  acetaminophen-codeine (TYLENOL #3) 300-30 MG per tablet, Take 1 tablet by mouth Every 4 (Four) Hours As Needed for Moderate Pain ., Disp: 45 tablet, Rfl: 0  •  ALLEGRA-D ALLERGY & CONGESTION  MG per 12 hr tablet, Take 1 tablet by mouth Daily., Disp: , Rfl: 11  •  amitriptyline (ELAVIL) 100 MG tablet, Take 100 mg by mouth Every Night., Disp: , Rfl:   •  amitriptyline (ELAVIL) 150 MG tablet, , Disp: , Rfl:   •  buPROPion XL (WELLBUTRIN XL) 300 MG 24 hr tablet, Take 300 mg by mouth Every Morning., Disp: , Rfl: 2  •  ELMIRON 100 MG capsule, Take 100 mg by mouth 3 (Three) Times a Day Before Meals., Disp: , Rfl:   •  esomeprazole (nexIUM) 40 MG capsule, Take 40 mg by mouth Every Morning Before Breakfast., Disp: , Rfl: 2  •  montelukast (SINGULAIR) 10 MG tablet, Take 10 mg by mouth Every Night., Disp: , Rfl: 2  •  topiramate (TOPAMAX) 100 MG tablet, Take 100 mg by mouth 2 (Two) Times a Day., Disp: , Rfl: 2      Allergies   Allergen Reactions   •  "Morphine And Related Rash     itching   • Sulfa Antibiotics Rash     itching         /80   Pulse 83   Temp 98.1 °F (36.7 °C) (Temporal)   Resp 18   Ht 165.1 cm (65\")   Wt 65.1 kg (143 lb 9.6 oz)   SpO2 100%   BMI 23.90 kg/m²       Physical Exam:  Constitutional: Patient is oriented to person, place, and time. Patient appears well-developed and well-nourished.   HEENT: Head: Normocephalic and atraumatic. Eyes: Conjunctivae and lids are normal. Pupils: Equal, round, reactive to light.   Neck: Trachea normal. Neck supple. No JVD present.   Lymphatic: No cervical adenopathy  Pulmonary Respiratory effort: No increased work of breathing or signs of respiratory distress. Auscultation of lungs: Clear to auscultation.   Cardiovascular Auscultation of heart: Normal rate and rhythm, normal S1 and S2, no murmurs.   Musculoskeletal   Gait and station: Gait evaluation demonstrated a normal gait   Cervical spine: Passive and active range of motion are limited secondary to pain. Extension, lateral flexion, rotation of the cervical spine increased and reproduced pain. Cervical facet joint loading maneuvers are positive.  Muscles: Presence of active trigger points at the left levator scapulae   Shoulders: The range of motion of the glenohumeral joints is full and without pain. Rotator cuff strength is 5/5.   Thoracic spine: Thoracic facet joint loading maneuvers are negative   Neurological: Patient is alert and oriented to person, place, and time. Speech: speech is normal. Cortical function: Normal mental status.   Cranial nerves: Cranial nerves 2-12 intact.   Reflex Scores:  Right brachioradialis: 2+  Left brachioradialis: 2+  Right biceps: 2+  Left biceps: 2+  Right triceps: 2+  Left triceps: 2+  Right patellar: 2+  Left patellar: 2+  Right Achilles: 2+  Left Achilles: 2+  Motor strength: 5/5  Motor Tone: normal tone.   Involuntary movements: none.   Superficial/Primitive Reflexes: primitive reflexes were absent. "   Right Burroughs: absent  Left Burroughs: absent  Right ankle clonus: absent  Left ankle clonus: absent   Spurling sign is negative. Neck tornado test is negative. Lhermitte sign is negative. Negative long tract signs.   Sensation: No sensory loss. Sensory exam: intact to light touch, intact pain and temperature sensation, intact vibration sensation and normal proprioception.   Coordination: Normal finger to nose and heel to shin. Normal balance and  negative Romberg's sign   Skin and subcutaneous tissue: Skin is warm and intact. No rash noted. No cyanosis.   Psychiatric: Judgment and insight: Normal. Orientation to person, place and time: Normal. Recent and remote memory: Intact. Mood and affect: Normal.     ASSESSMENT:   1. Cervical spondylosis with radiculopathy    2. Myofascial pain    3. Cervical stenosis of spine    4. Status post cervical spinal fusion    5. Tobacco abuse    6. Tobacco abuse counseling      PLAN/MEDICAL DECISION MAKING: I had a lengthy conversation with Ms. Mateusz Jordan regarding her chronic pain condition and potential therapeutic options including risks, benefits, alternative therapies, to name a few. Patient underwent an uncomplicated C6-C7 ACDF in November 2017. She got somewhat better after her surgery, but over the course of the last few months has experienced the recurrence of left neck, left shoulder and left arm pain. She states that it is similar to her pain prior to the surgery. MRI of the cervical spine on 04/10/2019, revealed significant susceptibility artifact at the C6-C7 disc space, disc osteophyte complex at C5-C6. Moderate lateral recess stenosis with possible impingement of the exiting right C6 nerve root. There is clinical and radiological correlation with left-sided C6 radiculopathy. Patient is a potential candidate for C5-C6 ACDF. However, there are real concerns about pseudoarthrosis due to her ongoing tobacco abuse. The patient reports that she is still actively  smoking, and never really stopped after her ACDF. Patient has failed to obtain pain relief with conservative measures, interventional pain management measures, and previous surgical intervention, as referenced above. I have reviewed all available patient's medical records as well as previous therapies as referenced above.  Therefore, I have proposed the following plan:  1. Pharmacological measures: Reviewed. Discussed.   A. Patient takes Tylenol #3. Patient also takes Elavil, Wellbutrin, Topamax   B. Trial with gabapentin 100 mg three times per day   C. Trial with tizanidine 2 mg 1/2 to 1 tablet two times a day as needed muscle spasm  D. Trial with Rheumate one tablet twice daily  E. Start pyridoxine 100 mg one tablet by mouth daily  F. Trial with prilocaine 2%, lidocaine 10%, imipramine 3%, capsaicin 0.001% and mannitol 20%  cream, apply 1 to 2 grams of cream to the affected areas every 4 to 6 hours as needed  Screening and compliance with controlled substances: Patient has completed a SOAPP questionnaire and ORT questionnaires (revealing low risk).  Eric report has been reviewed and appropriate. Patient has signed a consent for treatment with controlled substances after reviewing the document and verbalizing full understanding of the risks, benefits, alternatives, and consequences of compliance and adherence with treatment and comprehensive plan of care. Patient received in hand a copy of this document.  2. Interventional pain management measures: Patient will be scheduled for diagnostic and therapeutic left C5-C6 transforaminal epidural steroid injection   to determine if the C6 nerve root is contributing to her left arm and left shoulder pain. Patient will follow-up with Dr. Daniels thereafter.  3. Long-term rehabilitation efforts:  A. The patient does not have a history of falls. I did complete a risk assessment for falls.   B. Patient will start a comprehensive physical therapy program for upper body  strengthening/posture correction, core strengthening, gait and balance training, neurodynamics, ultrasound, myofascial release, cupping and dry needling   C. Start an exercise program such as yoga and Pilates  D. Trial with TENS unit/interferential unit  E. Contrast therapy: Apply ice-packs for 15-20 minutes, followed by heating pads for 15-20 minutes to affected area   F. Referral to Dr. Garo Bran for psychological evaluation, cognitive behavioral therapy, and biofeedback.  G. Patient has been screened for tobacco use: Current tobacco user. Smoking Cessation: I have advised the patient at length regarding the long-term deleterious effects of smoking and have provided the patient lifestyle modification strategies to facilitate smoking cessation. We have also discussed pharmacological interventions in addition to participation in support groups, individual counseling, to name a few to facilitate smoking/nicotine cessation. I spent approximately 5 minutes advising the patient. In addition, I have facilitated the following referrals: Referral to Dr. Garo Bran for individual therapy for smoking cessation and screening for Tx with Chantix   4. The patient has been instructed to contact my office with any questions or difficulties. The patient understands the plan and agrees to proceed accordingly.       Patient Care Team:  Amelie Medeiros PA as PCP - General (Physician Assistant)  Jack Noel DC as Consulting Physician (Chiropractic Medicine)  Aleksey Phillips MD as Consulting Physician (Neurology)  Julio Pate MD as Consulting Physician (Neurosurgery)  Andrew Daniels MD as Consulting Physician (Neurosurgery)  iMguel Sparks MD as Consulting Physician (Pain Medicine)     No orders of the defined types were placed in this encounter.        No future appointments.      Miguel Sparks MD     EMR Dragon/Transcription disclaimer:  Much of this encounter note is an electronic  transcription of spoken language to printed text. Electronic transcription of spoken language may permit erroneous, or at times, nonsensical words or phrases to be inadvertently transcribed. Although I have reviewed the note for such errors, some may still exist.

## 2019-05-16 ENCOUNTER — OFFICE VISIT (OUTPATIENT)
Dept: PAIN MEDICINE | Facility: CLINIC | Age: 39
End: 2019-05-16

## 2019-05-16 VITALS
BODY MASS INDEX: 23.93 KG/M2 | WEIGHT: 143.6 LBS | DIASTOLIC BLOOD PRESSURE: 80 MMHG | TEMPERATURE: 98.1 F | SYSTOLIC BLOOD PRESSURE: 110 MMHG | OXYGEN SATURATION: 100 % | HEART RATE: 83 BPM | HEIGHT: 65 IN | RESPIRATION RATE: 18 BRPM

## 2019-05-16 DIAGNOSIS — Z71.6 TOBACCO ABUSE COUNSELING: ICD-10-CM

## 2019-05-16 DIAGNOSIS — M47.22 CERVICAL SPONDYLOSIS WITH RADICULOPATHY: ICD-10-CM

## 2019-05-16 DIAGNOSIS — Z98.1 STATUS POST CERVICAL SPINAL FUSION: ICD-10-CM

## 2019-05-16 DIAGNOSIS — M48.02 CERVICAL STENOSIS OF SPINE: ICD-10-CM

## 2019-05-16 DIAGNOSIS — M79.18 MYOFASCIAL PAIN: ICD-10-CM

## 2019-05-16 DIAGNOSIS — Z72.0 TOBACCO ABUSE: ICD-10-CM

## 2019-05-16 DIAGNOSIS — M48.02 CERVICAL STENOSIS OF SPINAL CANAL: ICD-10-CM

## 2019-05-16 DIAGNOSIS — Z00.8 PRE-SURGICAL PSYCHOLOGICAL ASSESSMENT, ENCOUNTER FOR: ICD-10-CM

## 2019-05-16 DIAGNOSIS — M47.22 CERVICAL SPONDYLOSIS WITH RADICULOPATHY: Primary | ICD-10-CM

## 2019-05-16 PROCEDURE — 99204 OFFICE O/P NEW MOD 45 MIN: CPT | Performed by: ANESTHESIOLOGY

## 2019-05-16 PROCEDURE — 99406 BEHAV CHNG SMOKING 3-10 MIN: CPT | Performed by: ANESTHESIOLOGY

## 2019-05-16 RX ORDER — MULTIVITAMIN WITH IRON
100 TABLET ORAL DAILY
Qty: 30 TABLET | Refills: 5 | Status: SHIPPED | OUTPATIENT
Start: 2019-05-16

## 2019-05-16 RX ORDER — ME-TETRAHYDROFOLATE/B12/HRB236 1-1-500 MG
CAPSULE ORAL
Qty: 180 CAPSULE | Refills: 1 | Status: ON HOLD | OUTPATIENT
Start: 2019-05-16 | End: 2019-07-16

## 2019-05-16 RX ORDER — TIZANIDINE 2 MG/1
TABLET ORAL
Qty: 90 TABLET | Refills: 5 | Status: SHIPPED | OUTPATIENT
Start: 2019-05-16 | End: 2019-08-23 | Stop reason: HOSPADM

## 2019-05-16 RX ORDER — GABAPENTIN 100 MG/1
100 CAPSULE ORAL 3 TIMES DAILY
Qty: 90 CAPSULE | Refills: 1 | OUTPATIENT
Start: 2019-05-16 | End: 2019-08-19 | Stop reason: SDUPTHER

## 2019-05-30 ENCOUNTER — TELEPHONE (OUTPATIENT)
Dept: PAIN MEDICINE | Facility: CLINIC | Age: 39
End: 2019-05-30

## 2019-06-03 ENCOUNTER — TELEPHONE (OUTPATIENT)
Dept: PAIN MEDICINE | Facility: CLINIC | Age: 39
End: 2019-06-03

## 2019-06-03 NOTE — TELEPHONE ENCOUNTER
Patient is following POC. She is out of Tylenol #3 and wants to know if you have any other suggestions?     She is utilizing Elavil, Gabapentin, Tizanidine at bedtime, Pyridoxine, compounded cream, ice and heat as well as TENS unit.  Dr. Sparks notified.

## 2019-06-04 NOTE — TELEPHONE ENCOUNTER
Left patient a detailed message advising that she could take Tylenol 500 mg 1-2 tabs every 4-6 hours as needed as well as an NSAID OTC as long as there was no kidney disease, or GI issues to include ulcers. Direct extension given for call back as needed.

## 2019-06-12 ENCOUNTER — OUTSIDE FACILITY SERVICE (OUTPATIENT)
Dept: PAIN MEDICINE | Facility: CLINIC | Age: 39
End: 2019-06-12

## 2019-06-12 DIAGNOSIS — M48.02 CERVICAL STENOSIS OF SPINE: ICD-10-CM

## 2019-06-12 DIAGNOSIS — M47.22 CERVICAL SPONDYLOSIS WITH RADICULOPATHY: Primary | ICD-10-CM

## 2019-06-12 DIAGNOSIS — Z98.1 STATUS POST CERVICAL SPINAL FUSION: ICD-10-CM

## 2019-06-12 PROCEDURE — 99152 MOD SED SAME PHYS/QHP 5/>YRS: CPT | Performed by: ANESTHESIOLOGY

## 2019-06-12 PROCEDURE — 64479 NJX AA&/STRD TFRM EPI C/T 1: CPT | Performed by: ANESTHESIOLOGY

## 2019-06-13 ENCOUNTER — TELEPHONE (OUTPATIENT)
Dept: PAIN MEDICINE | Facility: CLINIC | Age: 39
End: 2019-06-13

## 2019-06-13 NOTE — TELEPHONE ENCOUNTER
Patient had dx/tx left L4-L5 and left L5-S1 TFESI on 06/12/19. Spoke with patient. She reports that she has some soreness. She also c/o headache. She states that the headache is constant and doesn't get any better with laying

## 2019-06-19 ENCOUNTER — TELEPHONE (OUTPATIENT)
Dept: NEUROSURGERY | Facility: CLINIC | Age: 39
End: 2019-06-19

## 2019-06-19 ENCOUNTER — OFFICE VISIT (OUTPATIENT)
Dept: NEUROSURGERY | Facility: CLINIC | Age: 39
End: 2019-06-19

## 2019-06-19 VITALS
BODY MASS INDEX: 24.09 KG/M2 | TEMPERATURE: 97.8 F | SYSTOLIC BLOOD PRESSURE: 102 MMHG | HEIGHT: 65 IN | DIASTOLIC BLOOD PRESSURE: 70 MMHG | WEIGHT: 144.6 LBS

## 2019-06-19 DIAGNOSIS — G89.29 CHRONIC SCAPULAR PAIN: Primary | ICD-10-CM

## 2019-06-19 DIAGNOSIS — Z98.1 STATUS POST CERVICAL SPINAL FUSION: ICD-10-CM

## 2019-06-19 DIAGNOSIS — M54.2 NECK PAIN: ICD-10-CM

## 2019-06-19 DIAGNOSIS — M89.8X1 CHRONIC SCAPULAR PAIN: Primary | ICD-10-CM

## 2019-06-19 PROCEDURE — 99214 OFFICE O/P EST MOD 30 MIN: CPT | Performed by: NEUROLOGICAL SURGERY

## 2019-06-19 RX ORDER — ACETAMINOPHEN AND CODEINE PHOSPHATE 300; 30 MG/1; MG/1
1 TABLET ORAL EVERY 6 HOURS PRN
Qty: 60 TABLET | Refills: 0 | Status: SHIPPED | OUTPATIENT
Start: 2019-06-19 | End: 2019-08-23 | Stop reason: HOSPADM

## 2019-06-19 NOTE — TELEPHONE ENCOUNTER
LVM for pt letting her know that [facility] had wrote her an rx for Tylenol #3.  She was going to wait at check out for me to bring it to her but had already let when I walked down there.  The rx is on my desk.  Pt is to call back to let us know if she wants to  or have it mailed.  Pt needs to obtain further RF of this from PM.

## 2019-06-19 NOTE — PROGRESS NOTES
Subjective     Chief Complaint: Left-sided neck and arm pain    Patient ID: Mateusz Jordan is a 39 y.o. female is here today for follow-up.    Neck Pain    This is a chronic problem. The current episode started more than 1 year ago. The problem occurs constantly. The problem has been gradually worsening. The pain is associated with a fall. The pain is present in the left side. The quality of the pain is described as aching. The pain is at a severity of 8/10. The pain is severe. The symptoms are aggravated by bending, position and twisting. Associated symptoms include headaches and weakness. Pertinent negatives include no chest pain, fever, numbness, photophobia or trouble swallowing. She has tried chiropractic manipulation, heat, home exercises, NSAIDs, oral narcotics, muscle relaxants, acetaminophen and bed rest for the symptoms. The treatment provided no relief.       This is a 39-year-old woman who underwent a C6-7 ACDF in November 2017.  She was having predominantly left arm pain at that time.  She reports that the surgery helped somewhat with the pain that radiated into her left forearm and hand, however her left-sided neck and shoulder pain never really improved since her operation.  She has undergone physical therapy and pain management without relief.    She underwent a selective left-sided C5-6 transforaminal injection with Dr. Sparks several weeks ago and reports no improvement in her symptoms.    She is involved in active litigation regarding a fall that she sustained prior to her first surgery.    The following portions of the patient's history were reviewed and updated as appropriate: allergies, current medications, past family history, past medical history, past social history, past surgical history and problem list.    Family history:   Family History   Problem Relation Age of Onset   • No Known Problems Mother    • Throat cancer Father        Social history:   Social History     Socioeconomic  History   • Marital status:      Spouse name: Not on file   • Number of children: Not on file   • Years of education: Not on file   • Highest education level: Not on file   Tobacco Use   • Smoking status: Current Every Day Smoker     Packs/day: 0.50     Types: Cigarettes   • Smokeless tobacco: Never Used   • Tobacco comment: QUIT 1 WEEK   Substance and Sexual Activity   • Alcohol use: No   • Drug use: No   • Sexual activity: Defer       Review of Systems   Constitutional: Negative for activity change, appetite change, chills, diaphoresis, fatigue, fever and unexpected weight change.   HENT: Negative for congestion, dental problem, drooling, ear discharge, ear pain, facial swelling, hearing loss, mouth sores, nosebleeds, postnasal drip, rhinorrhea, sinus pressure, sneezing, sore throat, tinnitus, trouble swallowing and voice change.    Eyes: Negative for photophobia, pain, discharge, redness, itching and visual disturbance.   Respiratory: Negative for apnea, cough, choking, chest tightness, shortness of breath, wheezing and stridor.    Cardiovascular: Negative for chest pain, palpitations and leg swelling.   Gastrointestinal: Negative for abdominal distention, abdominal pain, anal bleeding, blood in stool, constipation, diarrhea, nausea, rectal pain and vomiting.   Endocrine: Negative for cold intolerance, heat intolerance, polydipsia, polyphagia and polyuria.   Genitourinary: Negative for decreased urine volume, difficulty urinating, dysuria, enuresis, flank pain, frequency, genital sores, hematuria and urgency.   Musculoskeletal: Positive for neck pain. Negative for arthralgias, back pain, gait problem, joint swelling, myalgias and neck stiffness.   Skin: Negative for color change, pallor, rash and wound.   Allergic/Immunologic: Negative for environmental allergies, food allergies and immunocompromised state.   Neurological: Positive for weakness and headaches. Negative for dizziness, tremors, seizures,  "syncope, facial asymmetry, speech difficulty, light-headedness and numbness.   Hematological: Negative for adenopathy. Does not bruise/bleed easily.   Psychiatric/Behavioral: Negative for agitation, behavioral problems, confusion, decreased concentration, dysphoric mood, hallucinations, self-injury, sleep disturbance and suicidal ideas. The patient is not nervous/anxious and is not hyperactive.        Objective   Blood pressure 102/70, temperature 97.8 °F (36.6 °C), temperature source Temporal, height 165.1 cm (65\"), weight 65.6 kg (144 lb 9.6 oz), not currently breastfeeding.  Body mass index is 24.06 kg/m².    Physical Exam   Constitutional: She is oriented to person, place, and time. She appears well-developed.  Non-toxic appearance.   HENT:   Head: Normocephalic and atraumatic.   Right Ear: Hearing normal.   Left Ear: Hearing normal.   Nose: Nose normal.   Eyes: Conjunctivae, EOM and lids are normal. Pupils are equal, round, and reactive to light.   Neck: Normal range of motion. No JVD present.   Cardiovascular: Normal rate and regular rhythm.   Pulses:       Radial pulses are 2+ on the right side, and 2+ on the left side.   Pulmonary/Chest: Effort normal. No stridor. No respiratory distress. She has no wheezes.   Musculoskeletal:        Cervical back: She exhibits tenderness.        Back:         Arms:  Neurological: She is alert and oriented to person, place, and time. She has normal reflexes. She displays normal reflexes. No cranial nerve deficit. She exhibits normal muscle tone. She displays a negative Romberg sign. Coordination and gait normal. GCS eye subscore is 4. GCS verbal subscore is 5. GCS motor subscore is 6.   Reflex Scores:       Tricep reflexes are 2+ on the right side and 2+ on the left side.       Bicep reflexes are 2+ on the right side and 2+ on the left side.       Brachioradialis reflexes are 2+ on the right side and 2+ on the left side.  Skin: Skin is warm and dry. No rash noted. No " erythema.   Psychiatric: She has a normal mood and affect. Her behavior is normal. Judgment and thought content normal.   Nursing note and vitals reviewed.        Assessment/Plan     Independent Review of Radiographic Studies:      She has no new imaging for me to review.  I did review her flexion-extension imaging of her neck which demonstrates no evidence of mobile spondylolisthesis at C6-7.  She does have advanced degenerative disc disease at C5-6.    Available also for my review is a MRI of the cervical spine which was performed back in April.  I re-reviewed the study, and there is some moderate neuroforaminal stenosis at C5-6 secondary to a disc osteophyte complex.    Medical Decision Making:      It certainly possible that she is having some left-sided C6 radiculopathy referable to her disc osteophyte complex, however I am concerned by the fact that she reported no improvement in her symptoms despite having a transforaminal injection with Dr. Sparks.  This is a poor prognostic indicator regarding her potential outcome from an additional surgical intervention.    She is continuing to smoke, and she does have some evidence of subsidence at C6-7.  I have counseled her extensively on the risks of ongoing nicotine abuse and the increased rate of pseudoarthrosis and poor outcomes from spinal fusion.  She reports that she has been cleared to start Chantix and plans on doing so in the near future.  Unfortunately, she did continue to use tobacco during the period of osteogenesis following her first operation.    I would like to order a CT myelogram of the cervical spine.  This will help to determine whether she has any pseudoarthrosis or worsening subsidence at the site of her surgery.  It also may identify any dynamic nerve root compression at either C6 or C7 on the left side which might be contributing to her symptoms.  I will follow-up with her after her CT myelogram is been completed.    Mateusz was seen today  for neck pain.    Diagnoses and all orders for this visit:    Chronic scapular pain  -     Case Request Cath Lab: IR myelogram, cervical  -     acetaminophen-codeine (TYLENOL #3) 300-30 MG per tablet; Take 1 tablet by mouth Every 6 (Six) Hours As Needed for Moderate Pain .    Neck pain  -     Case Request Cath Lab: IR myelogram, cervical  -     acetaminophen-codeine (TYLENOL #3) 300-30 MG per tablet; Take 1 tablet by mouth Every 6 (Six) Hours As Needed for Moderate Pain .    Status post cervical spinal fusion  -     Case Request Cath Lab: IR myelogram, cervical  -     acetaminophen-codeine (TYLENOL #3) 300-30 MG per tablet; Take 1 tablet by mouth Every 6 (Six) Hours As Needed for Moderate Pain .        No Follow-up on file.           This document signed by MARGARETH Daniels MD June 19, 2019 11:54 AM

## 2019-07-11 ENCOUNTER — TELEPHONE (OUTPATIENT)
Dept: PAIN MEDICINE | Facility: CLINIC | Age: 39
End: 2019-07-11

## 2019-07-11 NOTE — TELEPHONE ENCOUNTER
Patient LVM requesting medication to stop smoking. Dr. Spraks notified.   Per Dr. Bran note there is not indication regarding this. In order to prescribe Chantix to stop smoking an updated or adended note is needed from Dr. Bran. LVM with patient with call back number detailing this.

## 2019-07-16 ENCOUNTER — APPOINTMENT (OUTPATIENT)
Dept: CT IMAGING | Facility: HOSPITAL | Age: 39
End: 2019-07-16

## 2019-07-16 ENCOUNTER — HOSPITAL ENCOUNTER (OUTPATIENT)
Facility: HOSPITAL | Age: 39
Setting detail: HOSPITAL OUTPATIENT SURGERY
Discharge: HOME | End: 2019-07-16
Attending: RADIOLOGY | Admitting: NEUROLOGICAL SURGERY

## 2019-07-16 VITALS
BODY MASS INDEX: 22.85 KG/M2 | OXYGEN SATURATION: 99 % | WEIGHT: 142.2 LBS | HEIGHT: 66 IN | TEMPERATURE: 97.6 F | HEART RATE: 77 BPM | SYSTOLIC BLOOD PRESSURE: 100 MMHG | DIASTOLIC BLOOD PRESSURE: 63 MMHG | RESPIRATION RATE: 14 BRPM

## 2019-07-16 DIAGNOSIS — G89.29 CHRONIC SCAPULAR PAIN: ICD-10-CM

## 2019-07-16 DIAGNOSIS — M89.8X1 CHRONIC SCAPULAR PAIN: ICD-10-CM

## 2019-07-16 DIAGNOSIS — Z98.1 STATUS POST CERVICAL SPINAL FUSION: ICD-10-CM

## 2019-07-16 DIAGNOSIS — M54.2 NECK PAIN: ICD-10-CM

## 2019-07-16 PROCEDURE — 72240 MYELOGRAPHY NECK SPINE: CPT | Performed by: RADIOLOGY

## 2019-07-16 PROCEDURE — 61055 INJECTION INTO BRAIN CANAL: CPT | Performed by: RADIOLOGY

## 2019-07-16 PROCEDURE — 72126 CT NECK SPINE W/DYE: CPT

## 2019-07-16 RX ORDER — LIDOCAINE HYDROCHLORIDE 10 MG/ML
INJECTION, SOLUTION EPIDURAL; INFILTRATION; INTRACAUDAL; PERINEURAL AS NEEDED
Status: DISCONTINUED | OUTPATIENT
Start: 2019-07-16 | End: 2019-07-16 | Stop reason: HOSPADM

## 2019-07-24 ENCOUNTER — OFFICE VISIT (OUTPATIENT)
Dept: NEUROSURGERY | Facility: CLINIC | Age: 39
End: 2019-07-24

## 2019-07-24 VITALS
HEIGHT: 66 IN | SYSTOLIC BLOOD PRESSURE: 110 MMHG | WEIGHT: 145.4 LBS | BODY MASS INDEX: 23.37 KG/M2 | DIASTOLIC BLOOD PRESSURE: 62 MMHG | TEMPERATURE: 98.4 F

## 2019-07-24 DIAGNOSIS — Z98.1 STATUS POST CERVICAL SPINAL FUSION: ICD-10-CM

## 2019-07-24 DIAGNOSIS — Z72.0 TOBACCO ABUSE: ICD-10-CM

## 2019-07-24 DIAGNOSIS — Z72.0 TOBACCO ABUSE: Primary | ICD-10-CM

## 2019-07-24 DIAGNOSIS — M47.22 CERVICAL SPONDYLOSIS WITH RADICULOPATHY: Primary | ICD-10-CM

## 2019-07-24 PROCEDURE — 99244 OFF/OP CNSLTJ NEW/EST MOD 40: CPT | Performed by: NEUROLOGICAL SURGERY

## 2019-07-24 RX ORDER — SODIUM CHLORIDE 0.9 % (FLUSH) 0.9 %
3-10 SYRINGE (ML) INJECTION AS NEEDED
Status: CANCELLED | OUTPATIENT
Start: 2019-07-24

## 2019-07-24 RX ORDER — CHLORHEXIDINE GLUCONATE 4 G/100ML
SOLUTION TOPICAL
Qty: 120 ML | Refills: 0 | Status: SHIPPED | OUTPATIENT
Start: 2019-07-24 | End: 2019-08-23 | Stop reason: HOSPADM

## 2019-07-24 RX ORDER — SODIUM CHLORIDE 0.9 % (FLUSH) 0.9 %
3 SYRINGE (ML) INJECTION EVERY 12 HOURS SCHEDULED
Status: CANCELLED | OUTPATIENT
Start: 2019-07-24

## 2019-07-24 NOTE — TELEPHONE ENCOUNTER
Start Chantix start pack for one month (0.5 mg daily after eating with a full glass of water for 3 days, then, 0.5 mg every 12 hours for 4 days, then, 1 mg by mouth twice a day). Continue Chantix 1 mg for 11 weeks. If quitting is successful after 12 weeks, then, continue another 12 weeks at 1 mg twice a day.   Begin therapy and set a quit date between days 7 and 14     Spoke with patient. Advised of above instructions. Patient verbalized understanding. Verified pharmacy with patient.

## 2019-07-24 NOTE — H&P
"Subjective     Chief Complaint: Neck and left arm and shoulder pain    Patient ID: Mateusz Jordan is a 39 y.o. female is here today for follow-up.    History of Present Illness    This is a 39-year-old woman in whom I performed a C6-7 ACDF in November 2017.  She has struggled with residual left arm and shoulder pain since that time.  I ordered a CT myelogram of her cervical spine and she presents today to discuss the results of the study.    She states that her pain is severe and constant.  She states \"I cannot live like this.\"    The following portions of the patient's history were reviewed and updated as appropriate: allergies, current medications, past family history, past medical history, past social history, past surgical history and problem list.    Family history:   Family History   Problem Relation Age of Onset   • No Known Problems Mother    • Throat cancer Father        Social history:   Social History     Socioeconomic History   • Marital status:      Spouse name: Not on file   • Number of children: Not on file   • Years of education: Not on file   • Highest education level: Not on file   Tobacco Use   • Smoking status: Current Every Day Smoker     Packs/day: 0.50     Types: Cigarettes   • Smokeless tobacco: Never Used   Substance and Sexual Activity   • Alcohol use: No   • Drug use: No   • Sexual activity: Defer       Review of Systems   Constitutional: Negative for activity change, appetite change, chills, diaphoresis, fatigue, fever and unexpected weight change.   HENT: Negative for congestion, dental problem, drooling, ear discharge, ear pain, facial swelling, hearing loss, mouth sores, nosebleeds, postnasal drip, rhinorrhea, sinus pressure, sneezing, sore throat, tinnitus, trouble swallowing and voice change.    Eyes: Negative for photophobia, pain, discharge, redness, itching and visual disturbance.   Respiratory: Negative for apnea, cough, choking, chest tightness, shortness of breath, " "wheezing and stridor.    Cardiovascular: Negative for chest pain, palpitations and leg swelling.   Gastrointestinal: Negative for abdominal distention, abdominal pain, anal bleeding, blood in stool, constipation, diarrhea, nausea, rectal pain and vomiting.   Endocrine: Negative for cold intolerance, heat intolerance, polydipsia, polyphagia and polyuria.   Genitourinary: Negative for decreased urine volume, difficulty urinating, dysuria, enuresis, flank pain, frequency, genital sores, hematuria and urgency.   Musculoskeletal: Positive for neck pain. Negative for arthralgias, back pain, gait problem, joint swelling, myalgias and neck stiffness.   Skin: Negative for color change, pallor, rash and wound.   Allergic/Immunologic: Negative for environmental allergies, food allergies and immunocompromised state.   Neurological: Positive for weakness and headaches. Negative for dizziness, tremors, seizures, syncope, facial asymmetry, speech difficulty, light-headedness and numbness.   Hematological: Negative for adenopathy. Does not bruise/bleed easily.   Psychiatric/Behavioral: Negative for agitation, behavioral problems, confusion, decreased concentration, dysphoric mood, hallucinations, self-injury, sleep disturbance and suicidal ideas. The patient is not nervous/anxious and is not hyperactive.        Objective   Blood pressure 110/62, temperature 98.4 °F (36.9 °C), temperature source Temporal, height 167.6 cm (65.98\"), weight 66 kg (145 lb 6.4 oz), not currently breastfeeding.  Body mass index is 23.48 kg/m².    Physical Exam   Constitutional: She is oriented to person, place, and time. She appears well-developed.  Non-toxic appearance.   HENT:   Head: Normocephalic and atraumatic.   Right Ear: Hearing normal.   Left Ear: Hearing normal.   Nose: Nose normal.   Eyes: Conjunctivae, EOM and lids are normal. Pupils are equal, round, and reactive to light.   Neck: Normal range of motion. No JVD present.   Cardiovascular: " Normal rate and regular rhythm.   Pulses:       Radial pulses are 2+ on the right side, and 2+ on the left side.   Pulmonary/Chest: Effort normal. No stridor. No respiratory distress. She has no wheezes.   Musculoskeletal:        Cervical back: She exhibits tenderness.        Back:         Arms:  Neurological: She is alert and oriented to person, place, and time. She has normal reflexes. She displays normal reflexes. No cranial nerve deficit. She exhibits normal muscle tone. She displays a negative Romberg sign. Coordination and gait normal. GCS eye subscore is 4. GCS verbal subscore is 5. GCS motor subscore is 6.   Reflex Scores:       Tricep reflexes are 2+ on the right side and 2+ on the left side.       Bicep reflexes are 2+ on the right side and 2+ on the left side.       Brachioradialis reflexes are 2+ on the right side and 2+ on the left side.  Skin: Skin is warm and dry. No rash noted. No erythema.   Psychiatric: She has a normal mood and affect. Her behavior is normal. Judgment and thought content normal.   Nursing note and vitals reviewed.        Assessment/Plan     Independent Review of Radiographic Studies:      Her CT myelogram demonstrates nerve root compromise at C5-6 on the left side.    Medical Decision Making:      A lengthy and protracted conversation was held with patient regarding the complexity of her case.  She is desperate for some sort of relief and is not interested in continuing any physical therapy or pain management.  I offered a reexploration of her C6-7 ACDF and adjacent level C5-6 ACDF.  Informed consent for this procedure was obtained from the patient.  She acknowledges that she is at increased risk of periprocedural complications due to her prior operation, specifically recurrent laryngeal nerve injury, tracheal/esophageal injury, or a more extensive operation such as a corpectomy may be required if there is evidence of pseudoarthrosis given her subsidence at C6-7.    A lengthy and  protracted conversation was also held with her regarding the importance of smoking cessation.  Certainly, she is at increased risk of periprocedural complications specifically pseudoarthrosis and subsidence if she continues to smoke.  She indicated to me that she would be willing to try to stop smoking again.  We will schedule her for a C6-7 ACDF exploration and a C5-6 ACDF on an elective basis in the near future.    Mateusz was seen today for neck pain.    Diagnoses and all orders for this visit:    Cervical spondylosis with radiculopathy    Tobacco abuse    Status post cervical spinal fusion        No Follow-up on file.           This document signed by MARGARETH Daniels MD July 24, 2019 3:06 PM

## 2019-08-18 ENCOUNTER — APPOINTMENT (OUTPATIENT)
Dept: PREADMISSION TESTING | Facility: HOSPITAL | Age: 39
End: 2019-08-18

## 2019-08-18 VITALS — HEIGHT: 66 IN | BODY MASS INDEX: 23.49 KG/M2 | WEIGHT: 146.16 LBS

## 2019-08-18 DIAGNOSIS — Z98.1 STATUS POST CERVICAL SPINAL FUSION: ICD-10-CM

## 2019-08-18 DIAGNOSIS — M47.22 CERVICAL SPONDYLOSIS WITH RADICULOPATHY: ICD-10-CM

## 2019-08-18 LAB
ANION GAP SERPL CALCULATED.3IONS-SCNC: 12 MMOL/L (ref 5–15)
BACTERIA UR QL AUTO: ABNORMAL /HPF
BILIRUB UR QL STRIP: ABNORMAL
BUN BLD-MCNC: 16 MG/DL (ref 6–20)
BUN/CREAT SERPL: 17.8 (ref 7–25)
CALCIUM SPEC-SCNC: 9.2 MG/DL (ref 8.6–10.5)
CHLORIDE SERPL-SCNC: 107 MMOL/L (ref 98–107)
CLARITY UR: CLEAR
CO2 SERPL-SCNC: 23 MMOL/L (ref 22–29)
COLOR UR: ABNORMAL
CREAT BLD-MCNC: 0.9 MG/DL (ref 0.57–1)
DEPRECATED RDW RBC AUTO: 44.4 FL (ref 37–54)
ERYTHROCYTE [DISTWIDTH] IN BLOOD BY AUTOMATED COUNT: 13 % (ref 12.3–15.4)
GFR SERPL CREATININE-BSD FRML MDRD: 69 ML/MIN/1.73
GLUCOSE BLD-MCNC: 93 MG/DL (ref 65–99)
GLUCOSE UR STRIP-MCNC: NEGATIVE MG/DL
HBA1C MFR BLD: 4.9 % (ref 4.8–5.6)
HCT VFR BLD AUTO: 29.8 % (ref 34–46.6)
HGB BLD-MCNC: 9.3 G/DL (ref 12–15.9)
HGB UR QL STRIP.AUTO: NEGATIVE
HYALINE CASTS UR QL AUTO: ABNORMAL /LPF
KETONES UR QL STRIP: NEGATIVE
LEUKOCYTE ESTERASE UR QL STRIP.AUTO: ABNORMAL
MCH RBC QN AUTO: 29.1 PG (ref 26.6–33)
MCHC RBC AUTO-ENTMCNC: 31.2 G/DL (ref 31.5–35.7)
MCV RBC AUTO: 93.1 FL (ref 79–97)
NITRITE UR QL STRIP: POSITIVE
PH UR STRIP.AUTO: 6.5 [PH] (ref 5–8)
PLATELET # BLD AUTO: 247 10*3/MM3 (ref 140–450)
PMV BLD AUTO: 10.2 FL (ref 6–12)
POTASSIUM BLD-SCNC: 4.1 MMOL/L (ref 3.5–5.2)
PROT UR QL STRIP: NEGATIVE
RBC # BLD AUTO: 3.2 10*6/MM3 (ref 3.77–5.28)
RBC # UR: ABNORMAL /HPF
REF LAB TEST METHOD: ABNORMAL
SODIUM BLD-SCNC: 142 MMOL/L (ref 136–145)
SP GR UR STRIP: 1.01 (ref 1–1.03)
SQUAMOUS #/AREA URNS HPF: ABNORMAL /HPF
UROBILINOGEN UR QL STRIP: ABNORMAL
WBC NRBC COR # BLD: 5.24 10*3/MM3 (ref 3.4–10.8)
WBC UR QL AUTO: ABNORMAL /HPF

## 2019-08-18 PROCEDURE — 87086 URINE CULTURE/COLONY COUNT: CPT | Performed by: NEUROLOGICAL SURGERY

## 2019-08-18 PROCEDURE — 83036 HEMOGLOBIN GLYCOSYLATED A1C: CPT | Performed by: ANESTHESIOLOGY

## 2019-08-18 PROCEDURE — 85027 COMPLETE CBC AUTOMATED: CPT | Performed by: NEUROLOGICAL SURGERY

## 2019-08-18 PROCEDURE — 81001 URINALYSIS AUTO W/SCOPE: CPT | Performed by: NEUROLOGICAL SURGERY

## 2019-08-18 PROCEDURE — 80048 BASIC METABOLIC PNL TOTAL CA: CPT | Performed by: NEUROLOGICAL SURGERY

## 2019-08-18 PROCEDURE — 36415 COLL VENOUS BLD VENIPUNCTURE: CPT

## 2019-08-18 PROCEDURE — 87081 CULTURE SCREEN ONLY: CPT | Performed by: ANESTHESIOLOGY

## 2019-08-18 RX ORDER — IBUPROFEN AND FAMOTIDINE 26.6; 8 MG/1; MG/1
1 TABLET, FILM COATED ORAL 2 TIMES DAILY
COMMUNITY
End: 2019-08-23 | Stop reason: HOSPADM

## 2019-08-18 RX ORDER — METHENAMINE, SODIUM PHOSPHATE, MONOBASIC, MONOHYDRATE, PHENYL SALICYLATE, METHYLENE BLUE, AND HYOSCYAMINE SULFATE 120; 40.8; 36; 10; .12 MG/1; MG/1; MG/1; MG/1; MG/1
1 CAPSULE ORAL 3 TIMES DAILY PRN
COMMUNITY
End: 2022-11-25

## 2019-08-18 RX ORDER — NITROFURANTOIN 25; 75 MG/1; MG/1
100 CAPSULE ORAL 2 TIMES DAILY
COMMUNITY
Start: 2019-08-16 | End: 2022-11-25

## 2019-08-18 RX ORDER — AMITRIPTYLINE HYDROCHLORIDE 150 MG/1
150 TABLET, FILM COATED ORAL NIGHTLY
COMMUNITY

## 2019-08-18 NOTE — PAT
christy Jarvis notified of hgb and hct, currently on macrobid for uti as of 8-16-19, ua results and culture pending, no new orders

## 2019-08-18 NOTE — PAT
Chlorhexidine Prescription given during PAT visit, as well as written and verbal instructions given to patient during PAT visit.  Patient/family also instructed to complete skin prep checklist and return the checklist on the day of surgery to preoperative staff.  Patient/family verbalized understanding.    Patient to apply Chlorhexadine wipes  to surgical area (as instructed) the night before procedure and the AM of procedure. Wipes provided.    Patient instructed to drink 20 ounces (or until full) of Gatorade and it needs to be completed 1 hour before given arrival time for procedure (NO RED Gatorade)    Patient verbalized understanding.

## 2019-08-19 LAB — MRSA SPEC QL CULT: NORMAL

## 2019-08-19 RX ORDER — GABAPENTIN 100 MG/1
CAPSULE ORAL
Qty: 90 CAPSULE | Refills: 2 | OUTPATIENT
Start: 2019-08-19 | End: 2022-11-25

## 2019-08-19 NOTE — TELEPHONE ENCOUNTER
Received refill request for Gabapentin. Patient last seen 06/12/19. Eric report # [id number] scanned to media. Rx called in to pharmacy

## 2019-08-20 LAB — BACTERIA SPEC AEROBE CULT: NO GROWTH

## 2019-08-21 ENCOUNTER — ANESTHESIA EVENT (OUTPATIENT)
Dept: PERIOP | Facility: HOSPITAL | Age: 39
End: 2019-08-21

## 2019-08-21 RX ORDER — SODIUM CHLORIDE 0.9 % (FLUSH) 0.9 %
3 SYRINGE (ML) INJECTION EVERY 12 HOURS SCHEDULED
Status: CANCELLED | OUTPATIENT
Start: 2019-08-21

## 2019-08-21 RX ORDER — FAMOTIDINE 10 MG/ML
20 INJECTION, SOLUTION INTRAVENOUS ONCE
Status: CANCELLED | OUTPATIENT
Start: 2019-08-21 | End: 2019-08-21

## 2019-08-21 RX ORDER — SODIUM CHLORIDE 0.9 % (FLUSH) 0.9 %
3-10 SYRINGE (ML) INJECTION AS NEEDED
Status: CANCELLED | OUTPATIENT
Start: 2019-08-21

## 2019-08-22 ENCOUNTER — HOSPITAL ENCOUNTER (OUTPATIENT)
Facility: HOSPITAL | Age: 39
Discharge: HOME | End: 2019-08-23
Attending: NEUROLOGICAL SURGERY | Admitting: NEUROLOGICAL SURGERY

## 2019-08-22 ENCOUNTER — ANESTHESIA (OUTPATIENT)
Dept: PERIOP | Facility: HOSPITAL | Age: 39
End: 2019-08-22

## 2019-08-22 ENCOUNTER — APPOINTMENT (OUTPATIENT)
Dept: GENERAL RADIOLOGY | Facility: HOSPITAL | Age: 39
End: 2019-08-22

## 2019-08-22 DIAGNOSIS — M47.22 CERVICAL SPONDYLOSIS WITH RADICULOPATHY: ICD-10-CM

## 2019-08-22 DIAGNOSIS — Z98.1 STATUS POST CERVICAL SPINAL FUSION: ICD-10-CM

## 2019-08-22 PROCEDURE — C1713 ANCHOR/SCREW BN/BN,TIS/BN: HCPCS | Performed by: NEUROLOGICAL SURGERY

## 2019-08-22 PROCEDURE — 25010000002 ONDANSETRON PER 1 MG: Performed by: NURSE ANESTHETIST, CERTIFIED REGISTERED

## 2019-08-22 PROCEDURE — 25010000002 FENTANYL CITRATE (PF) 100 MCG/2ML SOLUTION: Performed by: NURSE ANESTHETIST, CERTIFIED REGISTERED

## 2019-08-22 PROCEDURE — 22845 INSERT SPINE FIXATION DEVICE: CPT | Performed by: NEUROLOGICAL SURGERY

## 2019-08-22 PROCEDURE — G0378 HOSPITAL OBSERVATION PER HR: HCPCS

## 2019-08-22 PROCEDURE — 25010000002 ONDANSETRON PER 1 MG: Performed by: PHYSICIAN ASSISTANT

## 2019-08-22 PROCEDURE — 25010000002 CEFAZOLIN PER 500 MG: Performed by: NEUROLOGICAL SURGERY

## 2019-08-22 PROCEDURE — 25010000002 NEOSTIGMINE 10 MG/10ML SOLUTION: Performed by: NURSE ANESTHETIST, CERTIFIED REGISTERED

## 2019-08-22 PROCEDURE — 25010000002 PHENYLEPHRINE PER 1 ML: Performed by: NURSE ANESTHETIST, CERTIFIED REGISTERED

## 2019-08-22 PROCEDURE — 25010000003 CEFAZOLIN IN DEXTROSE 2-4 GM/100ML-% SOLUTION: Performed by: NEUROLOGICAL SURGERY

## 2019-08-22 PROCEDURE — 25010000002 HYDROMORPHONE PER 4 MG: Performed by: NEUROLOGICAL SURGERY

## 2019-08-22 PROCEDURE — 76000 FLUOROSCOPY <1 HR PHYS/QHP: CPT

## 2019-08-22 PROCEDURE — 22551 ARTHRD ANT NTRBDY CERVICAL: CPT | Performed by: NEUROLOGICAL SURGERY

## 2019-08-22 PROCEDURE — 25010000002 DEXAMETHASONE PER 1 MG: Performed by: NEUROLOGICAL SURGERY

## 2019-08-22 PROCEDURE — 22830 EXPLORATION OF SPINAL FUSION: CPT | Performed by: NEUROLOGICAL SURGERY

## 2019-08-22 PROCEDURE — 25010000002 PROPOFOL 10 MG/ML EMULSION: Performed by: NURSE ANESTHETIST, CERTIFIED REGISTERED

## 2019-08-22 DEVICE — PLT SKYLINE 1LEVEL 14MM: Type: IMPLANTABLE DEVICE | Site: EPIDURAL SPACE | Status: FUNCTIONAL

## 2019-08-22 DEVICE — ALLOGRFT BONE VIVIGEN CELLUAR MATRX FORMABLE 1CC: Type: IMPLANTABLE DEVICE | Site: EPIDURAL SPACE | Status: FUNCTIONAL

## 2019-08-22 DEVICE — BENGAL SYSTEM LARGE IMPLANT 6MM, 7 DEGREES
Type: IMPLANTABLE DEVICE | Site: EPIDURAL SPACE | Status: FUNCTIONAL
Brand: BENGAL

## 2019-08-22 DEVICE — SCRW SKYLINE VAR SD 14MM: Type: IMPLANTABLE DEVICE | Site: EPIDURAL SPACE | Status: FUNCTIONAL

## 2019-08-22 RX ORDER — ONDANSETRON 2 MG/ML
4 INJECTION INTRAMUSCULAR; INTRAVENOUS EVERY 6 HOURS PRN
Status: DISCONTINUED | OUTPATIENT
Start: 2019-08-22 | End: 2019-08-23 | Stop reason: HOSPADM

## 2019-08-22 RX ORDER — CEFAZOLIN SODIUM 2 G/100ML
2 INJECTION, SOLUTION INTRAVENOUS EVERY 8 HOURS
Status: COMPLETED | OUTPATIENT
Start: 2019-08-22 | End: 2019-08-23

## 2019-08-22 RX ORDER — SODIUM CHLORIDE 0.9 % (FLUSH) 0.9 %
3-10 SYRINGE (ML) INJECTION AS NEEDED
Status: DISCONTINUED | OUTPATIENT
Start: 2019-08-22 | End: 2019-08-23 | Stop reason: HOSPADM

## 2019-08-22 RX ORDER — LIDOCAINE HYDROCHLORIDE AND EPINEPHRINE 5; 5 MG/ML; UG/ML
INJECTION, SOLUTION INFILTRATION; PERINEURAL AS NEEDED
Status: DISCONTINUED | OUTPATIENT
Start: 2019-08-22 | End: 2019-08-22 | Stop reason: HOSPADM

## 2019-08-22 RX ORDER — CYCLOBENZAPRINE HCL 10 MG
10 TABLET ORAL 3 TIMES DAILY PRN
Status: DISCONTINUED | OUTPATIENT
Start: 2019-08-22 | End: 2019-08-23 | Stop reason: HOSPADM

## 2019-08-22 RX ORDER — OXYCODONE HYDROCHLORIDE AND ACETAMINOPHEN 5; 325 MG/1; MG/1
1 TABLET ORAL EVERY 4 HOURS PRN
Status: DISCONTINUED | OUTPATIENT
Start: 2019-08-22 | End: 2019-08-23

## 2019-08-22 RX ORDER — FENTANYL CITRATE 50 UG/ML
50 INJECTION, SOLUTION INTRAMUSCULAR; INTRAVENOUS
Status: DISCONTINUED | OUTPATIENT
Start: 2019-08-22 | End: 2019-08-22 | Stop reason: HOSPADM

## 2019-08-22 RX ORDER — SODIUM CHLORIDE 0.9 % (FLUSH) 0.9 %
3 SYRINGE (ML) INJECTION EVERY 12 HOURS SCHEDULED
Status: DISCONTINUED | OUTPATIENT
Start: 2019-08-22 | End: 2019-08-23 | Stop reason: HOSPADM

## 2019-08-22 RX ORDER — CEFAZOLIN SODIUM 2 G/100ML
2 INJECTION, SOLUTION INTRAVENOUS ONCE
Status: COMPLETED | OUTPATIENT
Start: 2019-08-22 | End: 2019-08-22

## 2019-08-22 RX ORDER — PROPOFOL 10 MG/ML
VIAL (ML) INTRAVENOUS AS NEEDED
Status: DISCONTINUED | OUTPATIENT
Start: 2019-08-22 | End: 2019-08-22 | Stop reason: SURG

## 2019-08-22 RX ORDER — MAGNESIUM HYDROXIDE 1200 MG/15ML
LIQUID ORAL AS NEEDED
Status: DISCONTINUED | OUTPATIENT
Start: 2019-08-22 | End: 2019-08-22 | Stop reason: HOSPADM

## 2019-08-22 RX ORDER — ROCURONIUM BROMIDE 10 MG/ML
INJECTION, SOLUTION INTRAVENOUS AS NEEDED
Status: DISCONTINUED | OUTPATIENT
Start: 2019-08-22 | End: 2019-08-22 | Stop reason: SURG

## 2019-08-22 RX ORDER — NITROFURANTOIN 25; 75 MG/1; MG/1
100 CAPSULE ORAL EVERY 12 HOURS SCHEDULED
Status: DISCONTINUED | OUTPATIENT
Start: 2019-08-22 | End: 2019-08-23 | Stop reason: HOSPADM

## 2019-08-22 RX ORDER — HYDROMORPHONE HYDROCHLORIDE 1 MG/ML
0.5 INJECTION, SOLUTION INTRAMUSCULAR; INTRAVENOUS; SUBCUTANEOUS
Status: DISCONTINUED | OUTPATIENT
Start: 2019-08-22 | End: 2019-08-22

## 2019-08-22 RX ORDER — TOPIRAMATE 25 MG/1
100 TABLET ORAL NIGHTLY
Status: DISCONTINUED | OUTPATIENT
Start: 2019-08-22 | End: 2019-08-23 | Stop reason: HOSPADM

## 2019-08-22 RX ORDER — GLYCOPYRROLATE 0.2 MG/ML
INJECTION INTRAMUSCULAR; INTRAVENOUS AS NEEDED
Status: DISCONTINUED | OUTPATIENT
Start: 2019-08-22 | End: 2019-08-22 | Stop reason: SURG

## 2019-08-22 RX ORDER — LIDOCAINE HYDROCHLORIDE 10 MG/ML
0.5 INJECTION, SOLUTION EPIDURAL; INFILTRATION; INTRACAUDAL; PERINEURAL ONCE AS NEEDED
Status: COMPLETED | OUTPATIENT
Start: 2019-08-22 | End: 2019-08-22

## 2019-08-22 RX ORDER — FENTANYL CITRATE 50 UG/ML
INJECTION, SOLUTION INTRAMUSCULAR; INTRAVENOUS AS NEEDED
Status: DISCONTINUED | OUTPATIENT
Start: 2019-08-22 | End: 2019-08-22 | Stop reason: SURG

## 2019-08-22 RX ORDER — SCOLOPAMINE TRANSDERMAL SYSTEM 1 MG/1
1 PATCH, EXTENDED RELEASE TRANSDERMAL CONTINUOUS
Status: ACTIVE | OUTPATIENT
Start: 2019-08-22 | End: 2019-08-23

## 2019-08-22 RX ORDER — ONDANSETRON 2 MG/ML
INJECTION INTRAMUSCULAR; INTRAVENOUS AS NEEDED
Status: DISCONTINUED | OUTPATIENT
Start: 2019-08-22 | End: 2019-08-22 | Stop reason: SURG

## 2019-08-22 RX ORDER — HYDROMORPHONE HYDROCHLORIDE 1 MG/ML
0.25 INJECTION, SOLUTION INTRAMUSCULAR; INTRAVENOUS; SUBCUTANEOUS EVERY 4 HOURS PRN
Status: DISCONTINUED | OUTPATIENT
Start: 2019-08-22 | End: 2019-08-23

## 2019-08-22 RX ORDER — FAMOTIDINE 20 MG/1
20 TABLET, FILM COATED ORAL ONCE
Status: COMPLETED | OUTPATIENT
Start: 2019-08-22 | End: 2019-08-22

## 2019-08-22 RX ORDER — AMITRIPTYLINE HYDROCHLORIDE 50 MG/1
150 TABLET, FILM COATED ORAL NIGHTLY
Status: DISCONTINUED | OUTPATIENT
Start: 2019-08-22 | End: 2019-08-23 | Stop reason: HOSPADM

## 2019-08-22 RX ORDER — ONDANSETRON 2 MG/ML
4 INJECTION INTRAMUSCULAR; INTRAVENOUS ONCE AS NEEDED
Status: COMPLETED | OUTPATIENT
Start: 2019-08-22 | End: 2019-08-22

## 2019-08-22 RX ORDER — SODIUM CHLORIDE, SODIUM LACTATE, POTASSIUM CHLORIDE, CALCIUM CHLORIDE 600; 310; 30; 20 MG/100ML; MG/100ML; MG/100ML; MG/100ML
9 INJECTION, SOLUTION INTRAVENOUS CONTINUOUS
Status: DISCONTINUED | OUTPATIENT
Start: 2019-08-22 | End: 2019-08-23 | Stop reason: HOSPADM

## 2019-08-22 RX ORDER — ONDANSETRON 4 MG/1
4 TABLET, FILM COATED ORAL EVERY 6 HOURS PRN
Status: DISCONTINUED | OUTPATIENT
Start: 2019-08-22 | End: 2019-08-23 | Stop reason: HOSPADM

## 2019-08-22 RX ORDER — NEOSTIGMINE METHYLSULFATE 1 MG/ML
INJECTION, SOLUTION INTRAVENOUS AS NEEDED
Status: DISCONTINUED | OUTPATIENT
Start: 2019-08-22 | End: 2019-08-22 | Stop reason: SURG

## 2019-08-22 RX ORDER — HYDROCODONE BITARTRATE AND ACETAMINOPHEN 5; 325 MG/1; MG/1
1 TABLET ORAL EVERY 4 HOURS PRN
Status: DISCONTINUED | OUTPATIENT
Start: 2019-08-22 | End: 2019-08-22

## 2019-08-22 RX ORDER — GABAPENTIN 100 MG/1
100 CAPSULE ORAL 3 TIMES DAILY
Status: DISCONTINUED | OUTPATIENT
Start: 2019-08-22 | End: 2019-08-23 | Stop reason: HOSPADM

## 2019-08-22 RX ORDER — LIDOCAINE HYDROCHLORIDE 10 MG/ML
INJECTION, SOLUTION EPIDURAL; INFILTRATION; INTRACAUDAL; PERINEURAL AS NEEDED
Status: DISCONTINUED | OUTPATIENT
Start: 2019-08-22 | End: 2019-08-22 | Stop reason: SURG

## 2019-08-22 RX ADMIN — FENTANYL CITRATE 50 MCG: 50 INJECTION INTRAMUSCULAR; INTRAVENOUS at 09:35

## 2019-08-22 RX ADMIN — ONDANSETRON 4 MG: 2 INJECTION INTRAMUSCULAR; INTRAVENOUS at 21:43

## 2019-08-22 RX ADMIN — GABAPENTIN 100 MG: 100 CAPSULE ORAL at 16:05

## 2019-08-22 RX ADMIN — CEFAZOLIN SODIUM 2 G: 2 INJECTION, SOLUTION INTRAVENOUS at 07:34

## 2019-08-22 RX ADMIN — PHENYLEPHRINE HYDROCHLORIDE 100 MCG: 10 INJECTION INTRAVENOUS at 07:49

## 2019-08-22 RX ADMIN — PENTOSAN POLYSULFATE SODIUM 100 MG: 100 CAPSULE, GELATIN COATED ORAL at 16:33

## 2019-08-22 RX ADMIN — EPHEDRINE SULFATE 10 MG: 50 INJECTION INTRAMUSCULAR; INTRAVENOUS; SUBCUTANEOUS at 07:50

## 2019-08-22 RX ADMIN — SODIUM CHLORIDE, POTASSIUM CHLORIDE, SODIUM LACTATE AND CALCIUM CHLORIDE: 600; 310; 30; 20 INJECTION, SOLUTION INTRAVENOUS at 09:17

## 2019-08-22 RX ADMIN — EPHEDRINE SULFATE 10 MG: 50 INJECTION INTRAMUSCULAR; INTRAVENOUS; SUBCUTANEOUS at 08:11

## 2019-08-22 RX ADMIN — FAMOTIDINE 20 MG: 20 TABLET ORAL at 07:04

## 2019-08-22 RX ADMIN — CYCLOBENZAPRINE HYDROCHLORIDE 10 MG: 10 TABLET, FILM COATED ORAL at 18:53

## 2019-08-22 RX ADMIN — PROPOFOL 200 MG: 10 INJECTION, EMULSION INTRAVENOUS at 07:39

## 2019-08-22 RX ADMIN — LIDOCAINE HYDROCHLORIDE 50 MG: 10 INJECTION, SOLUTION EPIDURAL; INFILTRATION; INTRACAUDAL; PERINEURAL at 07:39

## 2019-08-22 RX ADMIN — PHENYLEPHRINE HYDROCHLORIDE 100 MCG: 10 INJECTION INTRAVENOUS at 07:46

## 2019-08-22 RX ADMIN — GABAPENTIN 100 MG: 100 CAPSULE ORAL at 20:13

## 2019-08-22 RX ADMIN — ROCURONIUM BROMIDE 40 MG: 10 INJECTION INTRAVENOUS at 07:39

## 2019-08-22 RX ADMIN — PHENYLEPHRINE HYDROCHLORIDE 100 MCG: 10 INJECTION INTRAVENOUS at 08:29

## 2019-08-22 RX ADMIN — SCOPALAMINE 1 PATCH: 1 PATCH, EXTENDED RELEASE TRANSDERMAL at 07:18

## 2019-08-22 RX ADMIN — LIDOCAINE HYDROCHLORIDE 0.5 ML: 10 INJECTION, SOLUTION EPIDURAL; INFILTRATION; INTRACAUDAL; PERINEURAL at 07:04

## 2019-08-22 RX ADMIN — EPHEDRINE SULFATE 10 MG: 50 INJECTION INTRAMUSCULAR; INTRAVENOUS; SUBCUTANEOUS at 07:43

## 2019-08-22 RX ADMIN — SODIUM CHLORIDE, PRESERVATIVE FREE 3 ML: 5 INJECTION INTRAVENOUS at 20:13

## 2019-08-22 RX ADMIN — OXYCODONE HYDROCHLORIDE AND ACETAMINOPHEN 1 TABLET: 5; 325 TABLET ORAL at 15:14

## 2019-08-22 RX ADMIN — ONDANSETRON 4 MG: 2 INJECTION INTRAMUSCULAR; INTRAVENOUS at 09:09

## 2019-08-22 RX ADMIN — CEFAZOLIN SODIUM 2 G: 2 INJECTION, SOLUTION INTRAVENOUS at 16:33

## 2019-08-22 RX ADMIN — PHENYLEPHRINE HYDROCHLORIDE 100 MCG: 10 INJECTION INTRAVENOUS at 07:52

## 2019-08-22 RX ADMIN — PENTOSAN POLYSULFATE SODIUM 100 MG: 100 CAPSULE, GELATIN COATED ORAL at 11:42

## 2019-08-22 RX ADMIN — HYDROMORPHONE HYDROCHLORIDE 0.5 MG: 1 INJECTION, SOLUTION INTRAMUSCULAR; INTRAVENOUS; SUBCUTANEOUS at 16:05

## 2019-08-22 RX ADMIN — FENTANYL CITRATE 50 MCG: 50 INJECTION INTRAMUSCULAR; INTRAVENOUS at 09:50

## 2019-08-22 RX ADMIN — FENTANYL CITRATE 50 MCG: 50 INJECTION, SOLUTION INTRAMUSCULAR; INTRAVENOUS at 07:39

## 2019-08-22 RX ADMIN — OXYCODONE HYDROCHLORIDE AND ACETAMINOPHEN 1 TABLET: 5; 325 TABLET ORAL at 18:53

## 2019-08-22 RX ADMIN — FENTANYL CITRATE 50 MCG: 50 INJECTION, SOLUTION INTRAMUSCULAR; INTRAVENOUS at 09:28

## 2019-08-22 RX ADMIN — ONDANSETRON 4 MG: 2 INJECTION INTRAMUSCULAR; INTRAVENOUS at 09:50

## 2019-08-22 RX ADMIN — AMITRIPTYLINE HYDROCHLORIDE 150 MG: 50 TABLET, FILM COATED ORAL at 20:12

## 2019-08-22 RX ADMIN — DEXAMETHASONE SODIUM PHOSPHATE 10 MG: 4 INJECTION, SOLUTION INTRAMUSCULAR; INTRAVENOUS at 07:44

## 2019-08-22 RX ADMIN — PHENYLEPHRINE HYDROCHLORIDE 100 MCG: 10 INJECTION INTRAVENOUS at 08:16

## 2019-08-22 RX ADMIN — FENTANYL CITRATE 50 MCG: 50 INJECTION INTRAMUSCULAR; INTRAVENOUS at 09:45

## 2019-08-22 RX ADMIN — NITROFURANTOIN (MONOHYDRATE/MACROCRYSTALS) 100 MG: 75; 25 CAPSULE ORAL at 11:42

## 2019-08-22 RX ADMIN — HYDROMORPHONE HYDROCHLORIDE 0.5 MG: 1 INJECTION, SOLUTION INTRAMUSCULAR; INTRAVENOUS; SUBCUTANEOUS at 20:13

## 2019-08-22 RX ADMIN — EPHEDRINE SULFATE 10 MG: 50 INJECTION INTRAMUSCULAR; INTRAVENOUS; SUBCUTANEOUS at 07:47

## 2019-08-22 RX ADMIN — CEFAZOLIN SODIUM 2 G: 2 INJECTION, SOLUTION INTRAVENOUS at 23:59

## 2019-08-22 RX ADMIN — SODIUM CHLORIDE, POTASSIUM CHLORIDE, SODIUM LACTATE AND CALCIUM CHLORIDE 9 ML/HR: 600; 310; 30; 20 INJECTION, SOLUTION INTRAVENOUS at 06:42

## 2019-08-22 RX ADMIN — NITROFURANTOIN (MONOHYDRATE/MACROCRYSTALS) 100 MG: 75; 25 CAPSULE ORAL at 20:12

## 2019-08-22 RX ADMIN — FENTANYL CITRATE 50 MCG: 50 INJECTION INTRAMUSCULAR; INTRAVENOUS at 10:05

## 2019-08-22 RX ADMIN — NEOSTIGMINE METHYLSULFATE 3 MG: 1 INJECTION, SOLUTION INTRAVENOUS at 09:09

## 2019-08-22 RX ADMIN — HYDROMORPHONE HYDROCHLORIDE 0.5 MG: 1 INJECTION, SOLUTION INTRAMUSCULAR; INTRAVENOUS; SUBCUTANEOUS at 10:58

## 2019-08-22 RX ADMIN — HYDROMORPHONE HYDROCHLORIDE 0.5 MG: 1 INJECTION, SOLUTION INTRAMUSCULAR; INTRAVENOUS; SUBCUTANEOUS at 17:58

## 2019-08-22 RX ADMIN — TOPIRAMATE 100 MG: 25 TABLET, FILM COATED ORAL at 20:12

## 2019-08-22 RX ADMIN — GLYCOPYRROLATE 0.4 MG: 0.2 INJECTION, SOLUTION INTRAMUSCULAR; INTRAVENOUS at 09:09

## 2019-08-22 NOTE — PLAN OF CARE
Problem: Patient Care Overview  Goal: Plan of Care Review  Outcome: Ongoing (interventions implemented as appropriate)   08/22/19 1640   Coping/Psychosocial   Plan of Care Reviewed With patient   Plan of Care Review   Progress no change   OTHER   Outcome Summary Pt A&OX4, VSS, on RA. No numbness/tingling present. Pt had ACD done by Dr. Daniels today, has been on floor since around 1030. Covaderm CDI. Main complaints are sore throat/neck and posterior shoulders, ice in place along with PRN pain meds, see MAR- complaints of pain are frequent, using IV and PO meds. ZOE drain serosangenious drainage. Pt able to ambulate up with 1 assist, standby. Voids without issue. Will continue to monitor.

## 2019-08-22 NOTE — OP NOTE
Pre-operative diagnosis: Cervical radiculopathy, history of prior ACDF  Post-operative diagnosis: Same    Procedure in detail: The patient was identified in the preoperative holding area and brought to the operating suite where She was transferred to the operating table. she then underwent the uneventful induction of general, endotracheal anesthesia. Venodynes were placed by the nursing staff. Her head was gently extended and a shoulder roll was placed transversely under the shoulders. The C-arm was used to localize the skin overlying the operative level. The skin was prepped with alcohol and an obliquely-oriented skin incision extending from the medial border of the right  sternocleidomastoid to the midline was then drawn. The operative field was then shaved, prepped, and draped in the usual sterile fashion. A time out was performed and intravenous antibiotics were administered. Local anesthetic with epinephrine was then infiltrated into the skin underlying the planned incision. A 15 blade was used to make the skin incision.  Sharp dissection was employed because of the patient's prior ACDF.  There was extensive scar tissue, however hemostasis was satisfactory during this portion of the procedure.  Bipolar electrocautery was used for hemostasis. A self-retaining retractor was placed and sequentially advanced. The platysma was sharply opened. The avascular plane medial to the sternocleidomastoid and lateral to the midline structures was developed using a combination of blunt and sharp dissection. The Cloward retractor was used to retract the midline structures to the contralateral side. A peanut was used to bluntly develop the prevertebral space. The longus coli was opened with a protected Bovie.    I identified the patient's prior instrumentation at C6-7.  Grossly, the C6 and C7 vertebral bodies appear to be fused, and I was unable to elicit any pathological motion.  Additionally, the spinal fixation device  appeared to be well integrated into the bone, and there was evidence of early bony ossification along the anterior aspect of the fusion.  I therefore concluded that the prior fusion was healing as expected, and a revision at this level was unnecessary.    I then exposed the C5-6 interspace, and placed cast bar pins in the C5 and C6 vertebral bodies.  An annulotomy was performed, and potential distraction across the disc space was then applied.  A complete discectomy at C5-6 was then carried out.  The posterior longitudinal ligament was opened sharply with a sharp hook, and the posterior osteophytes were resected using the Kerrison rongeurs.  There was an extruded disc fragment occupying the left lateral recess and a osteophyte eccentric to the left side was resected using the Kerrison rongeurs.  The endplates were meticulously prepared using the Shannon curettes.  After trialing, a 6 mm DePuy titanium interbody device was packed with allograft and was inserted into the disc space.  A 14 mm anterior plate was then affixed into place using a total of 4 3.5 x 14 mm screws.    PA and lateral fluoroscopy demonstrated satisfactory positioning of the implants and again confirmed the operative level(s). The field was then copiously irrigated with irrigation. Hemostasis was excellent. A 7 flat ZOE drain was placed in the pre-vertebral space and tunneled out through a separate stab incision. The platysma and skin were closed in layers. Glue and a sterile dressing were then applied.     Sponge, instrument and needle counts were all correct at the end of the case.  I performed this procedure with the assistance of Lilia Shepard, physician assistant.

## 2019-08-22 NOTE — ANESTHESIA PROCEDURE NOTES
Airway  Urgency: elective    Airway not difficult    General Information and Staff    Patient location during procedure: OR  CRNA: Salud Terry CRNA    Indications and Patient Condition  Indications for airway management: airway protection    Preoxygenated: yes  MILS not maintained throughout  Mask difficulty assessment: 1 - vent by mask    Final Airway Details  Final airway type: endotracheal airway      Successful airway: ETT  Cuffed: yes   Successful intubation technique: direct laryngoscopy  Facilitating devices/methods: intubating stylet  Endotracheal tube insertion site: oral  Blade type: glidescope.  Blade size: 3  ETT size (mm): 7.0  Cormack-Lehane Classification: grade I - full view of glottis  Placement verified by: chest auscultation and capnometry   Measured from: lips  ETT to lips (cm): 21  Number of attempts at approach: 1    Additional Comments  Negative epigastric sounds, Breath sound equal bilaterally with symmetric chest rise and fall      Elective glidesope for limited neck ROM r/t previous ACDF. Neck maintained in neutral position throughout intubation.

## 2019-08-22 NOTE — INTERVAL H&P NOTE
"Pre-Op H&P (See Recent Office Note Attached for Full H&P)    Chief complaint: Neck and left arm/shoulder pain    HPI:    Patient is a 39 y.o. female who presents with complaints of neck and left shoulder and arm pain. She underwent a C6-7 ACDF in November 2017.  She was having predominantly left arm pain at that time.  She reports that the surgery helped somewhat with the pain that radiated into her left forearm and hand, however her left-sided neck and shoulder pain never really improved since her operation.  She has undergone physical therapy and pain management without relief.     She underwent a selective left-sided C5-6 transforaminal injection with Dr. Sparks several weeks ago and reports no improvement in her symptoms.     She is involved in active litigation regarding a fall that she sustained prior to her first surgery.  She has struggled with residual left arm and shoulder pain since that time.  She states that her pain is severe and constant.  She states \"I cannot live like this.\"    Her CT myelogram demonstrates nerve root compromise at C5-6 on the left side.    She presents today for a reexploration of her C6-7 ACDF and adjacent level C5-6 ACDF.      Review of Systems:  General ROS:  no fever, chills, rashes, No change since last office visit  Cardiovascular ROS: no chest pain or dyspnea on exertion  Respiratory ROS: no cough, shortness of breath, or wheezing      Meds:    No current facility-administered medications on file prior to encounter.      Current Outpatient Medications on File Prior to Encounter   Medication Sig Dispense Refill   • buPROPion XL (WELLBUTRIN XL) 300 MG 24 hr tablet Take 300 mg by mouth Every Morning.  2   • chlorhexidine (HIBICLENS) 4 % external liquid Shower each day with solution for 5 days beginning 5 days before surgery. 120 mL 0   • ELMIRON 100 MG capsule Take 100 mg by mouth 3 (Three) Times a Day Before Meals.     • esomeprazole (nexIUM) 40 MG capsule Take 40 mg by " "mouth Every Morning Before Breakfast.  2   • montelukast (SINGULAIR) 10 MG tablet Take 10 mg by mouth Every Night.  2   • topiramate (TOPAMAX) 100 MG tablet Take 100 mg by mouth Every Night.  2   • varenicline (CHANTIX STARTING MONTH PAK) 0.5 MG X 11 & 1 MG X 42 tablet 0.5mg daily days 1-3, 0.5mg twice daily days 4-7.Then1 mg twice daily for a total of 12weeks.Set a quit date between days 7-14. (Patient taking differently: Take 1 mg by mouth Every 12 (Twelve) Hours. 0.5mg daily days 1-3, 0.5mg twice daily days 4-7.Then1 mg twice daily for a total of 12weeks.Set a quit date between days 7-14.) 53 tablet 0   • vitamin B-6 (PYRIDOXINE) 100 MG tablet Take 1 tablet by mouth Daily. 30 tablet 5   • acetaminophen-codeine (TYLENOL #3) 300-30 MG per tablet Take 1 tablet by mouth Every 6 (Six) Hours As Needed for Moderate Pain . 60 tablet 0   • Gel Base gel CAPSAICIN 0.001% IMIPRAMINE 3% LIDOCAINE 10% MANNITOL 20% MELOXICAM 0.1% PRILOCAINE 2%. APPLY 1-2 G TO AFFECTED AREA 3-4 TIMES DAILY (Patient taking differently: 1 dose Every 6 (Six) Hours As Needed (neck pain). CAPSAICIN 0.001% IMIPRAMINE 3% LIDOCAINE 10% MANNITOL 20% MELOXICAM 0.1% PRILOCAINE 2%. APPLY 1-2 G TO AFFECTED AREA 3-4 TIMES DAILY) 30 g PRN   • tiZANidine (ZANAFLEX) 2 MG tablet Take half to 1 tablet three times a day as needed for muscle spasms. (Patient taking differently: 2 mg Every 8 (Eight) Hours As Needed for Muscle Spasms. Take half to 1 tablet three times a day as needed for muscle spasms.) 90 tablet 5       Vital Signs:  /72 (BP Location: Right arm, Patient Position: Lying)   Pulse 78   Temp 97.9 °F (36.6 °C) (Temporal)   Resp 16   Ht 167.6 cm (66\")   Wt 66.2 kg (146 lb)   SpO2 99%   BMI 23.57 kg/m²     Physical Exam:    CV:  S1S2 regular rate and rhythm, no murmur               Resp:  Clear to auscultation; respirations regular, even and unlabored    Results Review:     Lab Results   Component Value Date    WBC 5.24 08/18/2019    HGB 9.3 " (L) 08/18/2019    HCT 29.8 (L) 08/18/2019    MCV 93.1 08/18/2019     08/18/2019    NEUTROABS 4.12 09/14/2017    GLUCOSE 93 08/18/2019    BUN 16 08/18/2019    CREATININE 0.90 08/18/2019    EGFRIFNONA 69 08/18/2019     08/18/2019    K 4.1 08/18/2019     08/18/2019    CO2 23.0 08/18/2019    CALCIUM 9.2 08/18/2019    ALBUMIN 4.40 09/14/2017    AST 20 09/14/2017    ALT 28 09/14/2017    BILITOT <0.2 09/14/2017        I reviewed the patient's new clinical results.      Assessment:  Cervical spondylosis with radiculopathy    Plan:  Reexploration of her C6-7 ACDF and adjacent level C5-6 ACDF.    Jennifer Hudson PA-C  8/22/2019   6:56 AM

## 2019-08-22 NOTE — ANESTHESIA POSTPROCEDURE EVALUATION
Patient: Mateusz Jordan    Procedure Summary     Date:  08/22/19 Room / Location:   [facility] [address] [place] [address] [facility]    Anesthesia Start:  0734 Anesthesia Stop:  0930    Procedure:  C5-6 anterior cervical discectomy and fusion, exploration of C6-7 anterior cervical discectomy and fusion (N/A Spine Cervical) Diagnosis:       Cervical spondylosis with radiculopathy      Status post cervical spinal fusion      (Cervical spondylosis with radiculopathy [M47.22])      (Status post cervical spinal fusion [Z98.1])    Surgeon:  Andrew Daniels MD Provider:  Jose Napier MD    Anesthesia Type:  general ASA Status:  3          Anesthesia Type: general  Last vitals  BP   102/70 (08/22/19 0925)   Temp   97.9 °F (36.6 °C) (08/22/19 0925)   Pulse   81 (08/22/19 0925)   Resp   14 (08/22/19 0925)     SpO2   98 % (08/22/19 0925)     Post Anesthesia Care and Evaluation    Patient location during evaluation: PACU  Patient participation: complete - patient participated  Level of consciousness: awake and alert  Pain score: 0  Pain management: adequate  Airway patency: patent  Anesthetic complications: No anesthetic complications  PONV Status: none  Cardiovascular status: hemodynamically stable and acceptable  Respiratory status: nonlabored ventilation, acceptable and nasal cannula  Hydration status: acceptable

## 2019-08-22 NOTE — ANESTHESIA PREPROCEDURE EVALUATION
Anesthesia Evaluation     history of anesthetic complications:               Airway   Mallampati: II  Dental      Pulmonary    Cardiovascular         Neuro/Psych  GI/Hepatic/Renal/Endo    (+)  GERD,      Musculoskeletal     Abdominal    Substance History      OB/GYN          Other                        Anesthesia Plan    ASA 3     general     intravenous induction   Anesthetic plan, all risks, benefits, and alternatives have been provided, discussed and informed consent has been obtained with: patient.

## 2019-08-23 VITALS
RESPIRATION RATE: 17 BRPM | WEIGHT: 146 LBS | BODY MASS INDEX: 23.46 KG/M2 | DIASTOLIC BLOOD PRESSURE: 58 MMHG | OXYGEN SATURATION: 96 % | HEIGHT: 66 IN | HEART RATE: 87 BPM | SYSTOLIC BLOOD PRESSURE: 94 MMHG | TEMPERATURE: 98.1 F

## 2019-08-23 PROCEDURE — 94799 UNLISTED PULMONARY SVC/PX: CPT

## 2019-08-23 PROCEDURE — 99024 POSTOP FOLLOW-UP VISIT: CPT | Performed by: PHYSICIAN ASSISTANT

## 2019-08-23 PROCEDURE — G0378 HOSPITAL OBSERVATION PER HR: HCPCS

## 2019-08-23 PROCEDURE — 25010000002 HYDROMORPHONE PER 4 MG: Performed by: PHYSICIAN ASSISTANT

## 2019-08-23 RX ORDER — CYCLOBENZAPRINE HCL 10 MG
10 TABLET ORAL 3 TIMES DAILY PRN
Qty: 60 TABLET | Refills: 2 | Status: SHIPPED | OUTPATIENT
Start: 2019-08-23 | End: 2019-08-24

## 2019-08-23 RX ORDER — ONDANSETRON 4 MG/1
4 TABLET, FILM COATED ORAL EVERY 6 HOURS PRN
Qty: 30 TABLET | Refills: 0 | Status: SHIPPED | OUTPATIENT
Start: 2019-08-23

## 2019-08-23 RX ORDER — OXYCODONE AND ACETAMINOPHEN 7.5; 325 MG/1; MG/1
1 TABLET ORAL EVERY 4 HOURS PRN
Status: DISCONTINUED | OUTPATIENT
Start: 2019-08-23 | End: 2019-08-23 | Stop reason: HOSPADM

## 2019-08-23 RX ORDER — OXYCODONE AND ACETAMINOPHEN 7.5; 325 MG/1; MG/1
1 TABLET ORAL EVERY 4 HOURS PRN
Qty: 50 TABLET | Refills: 0
Start: 2019-08-23 | End: 2020-01-22

## 2019-08-23 RX ADMIN — GABAPENTIN 100 MG: 100 CAPSULE ORAL at 15:26

## 2019-08-23 RX ADMIN — OXYCODONE HYDROCHLORIDE AND ACETAMINOPHEN 1 TABLET: 5; 325 TABLET ORAL at 08:23

## 2019-08-23 RX ADMIN — OXYCODONE HYDROCHLORIDE AND ACETAMINOPHEN 1 TABLET: 7.5; 325 TABLET ORAL at 15:00

## 2019-08-23 RX ADMIN — NITROFURANTOIN (MONOHYDRATE/MACROCRYSTALS) 100 MG: 75; 25 CAPSULE ORAL at 08:23

## 2019-08-23 RX ADMIN — GABAPENTIN 100 MG: 100 CAPSULE ORAL at 08:23

## 2019-08-23 RX ADMIN — OXYCODONE HYDROCHLORIDE AND ACETAMINOPHEN 1 TABLET: 5; 325 TABLET ORAL at 03:44

## 2019-08-23 RX ADMIN — ONDANSETRON HYDROCHLORIDE 4 MG: 4 TABLET, FILM COATED ORAL at 15:00

## 2019-08-23 RX ADMIN — HYDROMORPHONE HYDROCHLORIDE 0.25 MG: 1 INJECTION, SOLUTION INTRAMUSCULAR; INTRAVENOUS; SUBCUTANEOUS at 05:24

## 2019-08-23 RX ADMIN — CYCLOBENZAPRINE HYDROCHLORIDE 10 MG: 10 TABLET, FILM COATED ORAL at 15:00

## 2019-08-23 RX ADMIN — PENTOSAN POLYSULFATE SODIUM 100 MG: 100 CAPSULE, GELATIN COATED ORAL at 11:20

## 2019-08-23 RX ADMIN — CYCLOBENZAPRINE HYDROCHLORIDE 10 MG: 10 TABLET, FILM COATED ORAL at 03:44

## 2019-08-23 RX ADMIN — PENTOSAN POLYSULFATE SODIUM 100 MG: 100 CAPSULE, GELATIN COATED ORAL at 07:33

## 2019-08-23 RX ADMIN — ONDANSETRON HYDROCHLORIDE 4 MG: 4 TABLET, FILM COATED ORAL at 04:10

## 2019-08-23 RX ADMIN — OXYCODONE HYDROCHLORIDE AND ACETAMINOPHEN 1 TABLET: 7.5; 325 TABLET ORAL at 10:38

## 2019-08-23 NOTE — PLAN OF CARE
Problem: Pain, Chronic (Adult)  Goal: Identify Related Risk Factors and Signs and Symptoms  Outcome: Ongoing (interventions implemented as appropriate)    Goal: Acceptable Pain/Comfort Level and Functional Ability  Outcome: Ongoing (interventions implemented as appropriate)      Problem: Laminectomy/Foraminotomy/Discectomy (Adult)  Goal: Signs and Symptoms of Listed Potential Problems Will be Absent, Minimized or Managed (Laminectomy/Foraminotomy/Discectomy)  Outcome: Ongoing (interventions implemented as appropriate)    Goal: Anesthesia/Sedation Recovery  Outcome: Ongoing (interventions implemented as appropriate)

## 2019-08-23 NOTE — PROGRESS NOTES
Discharge Planning Assessment  [facility]     Patient Name: Mateusz Jordan  MRN: [id number]  Today's Date: 8/23/2019    Admit Date: 8/22/2019    Discharge Needs Assessment     Row Name 08/23/19 1444       Living Environment    Lives With  spouse;child(veronique), dependent    Current Living Arrangements  home/apartment/condo    Primary Care Provided by  self    Provides Primary Care For  child(veronique)    Family Caregiver if Needed  spouse    Able to Return to Prior Arrangements  yes       Transition Planning    Patient/Family Anticipates Transition to  home    Transportation Anticipated  family or friend will provide       Discharge Needs Assessment    Equipment Currently Used at Home  none        Discharge Plan     Row Name 08/23/19 1445       Plan    Plan  Home    Patient/Family in Agreement with Plan  yes    Plan Comments  Spoke with patient at bedside. She lives with her  and dtr in a two story, bed and bath on first, in [facility]. PTA she was independent with ADL's and mobility. Denies any DME/HH needs at this time. Plan is home w/ assist from . CM following.     Final Discharge Disposition Code  01 - home or self-care        Destination      No service coordination in this encounter.      Durable Medical Equipment      No service coordination in this encounter.      Dialysis/Infusion      No service coordination in this encounter.      Home Medical Care      No service coordination in this encounter.      Therapy      No service coordination in this encounter.      Community Resources      No service coordination in this encounter.        Expected Discharge Date and Time     Expected Discharge Date Expected Discharge Time    Aug 23, 2019         Demographic Summary     Row Name 08/23/19 1444       General Information    Arrived From  home    Reason for Consult  discharge planning        Functional Status     Row Name 08/23/19 1444       Functional Status    Usual Activity Tolerance  good     Current Activity Tolerance  moderate        Psychosocial    No documentation.       Abuse/Neglect    No documentation.       Legal    No documentation.       Substance Abuse    No documentation.       Patient Forms    No documentation.           Obdulia Carrillo RN

## 2019-08-23 NOTE — PLAN OF CARE
Problem: Patient Care Overview  Goal: Plan of Care Review  Outcome: Ongoing (interventions implemented as appropriate)   08/23/19 9111   Coping/Psychosocial   Plan of Care Reviewed With patient   Plan of Care Review   Progress no change   OTHER   Outcome Summary pt with significant c/o pain beginning of shift requirring iv dilaudid. episode of nausea reported and orders rec'd for zofran. zofran given to pt with improvement. pt ambulated in hallway with staff and to restroom. no c/o. denies any numbness or tingling. ant neck dressng remains clean and dry. debibe with minimal output of 25 ml. voids without difficulty. family at bedside and attentive. vs noted. will cont to monitor

## 2019-08-23 NOTE — PROGRESS NOTES
"NEUROSURGERY PROGRESS NOTE    Interval History:   POD 1 s/p C5-6 ACDF. Patient is frequently requesting IV pain medication. Denies upper extremity pain. Complaining of incisional pain. Otherwise doing ok, ambulating without difficulty.     Vital Signs  Blood pressure 99/66, pulse 82, temperature 97.7 °F (36.5 °C), temperature source Oral, resp. rate 17, height 167.6 cm (66\"), weight 66.2 kg (146 lb), SpO2 98 %, not currently breastfeeding.    Physical Exam:  Awake, alert and oriented resting in bed in NAD   Incision is clean, dry and intact   ZOE drain with minimal output   Moves all extremities to command      Results Review:    I/O last 3 completed shifts:  In: 2200 [P.O.:1100; I.V.:1100]  Out: 2220 [Urine:2200; Drains:20]  No intake/output data recorded.      Assessment/Plan:   Will discontinue IV pain medication in anticipation of possible discharge home this afternoon or tomorrow   PT/OT   D/c ZOE drain     Lilia Shepard PA-C  08/23/19  9:14 AM    "

## 2019-08-23 NOTE — DISCHARGE INSTRUCTIONS
No lifting greater than 10-15 lbs  Avoid repetitive bending and twisting   Ok to shower and let water/soap run over incision  Do not scrub or soak incision. No tub bathing or swimming   You may have drainage from drain site for 3-4 days this is normal   Recommend using ice on incision and/or neck several times a day   No driving until follow up appointment   Call our office if you have any problems with your incision  Follow up with neurosurgery PA in 2 weeks for incision check

## 2019-08-23 NOTE — PLAN OF CARE
Problem: Laminectomy/Foraminotomy/Discectomy (Adult)  Goal: Anesthesia/Sedation Recovery  Outcome: Ongoing (interventions implemented as appropriate)   08/23/19 1420   Goal/Outcome Evaluation   Anesthesia/Sedation Recovery criteria met for disch   08/23/19 1420   Goal/Outcome Evaluation   Anesthesia/Sedation Recovery criteria met for discharge   arge

## 2019-08-24 RX ORDER — CYCLOBENZAPRINE HCL 10 MG
10 TABLET ORAL 3 TIMES DAILY PRN
Qty: 60 TABLET | Refills: 1 | Status: SHIPPED | OUTPATIENT
Start: 2019-08-24 | End: 2019-10-09 | Stop reason: ALTCHOICE

## 2019-08-26 NOTE — DISCHARGE SUMMARY
DISCHARGE SUMMARY  PATIENT:  SUNG KONG  YOB: 1980  VISIT ID:  [id number]  PRIMARY CARE:  Keon Zavala MD  ADMITTING PHYSICIAN:  Marilia att. providers found    DATE OF ADMISSION:  8/22/2019  DATE OF DISCHARGE:  08/26/19      ADMITTING DIAGNOSIS:  Cervical radiculopathy, history of prior ACDF    DISCHARGE DIAGNOSIS:  Cervical radiculopathy, history of prior ACDF    Past Medical History:   Diagnosis Date   • Arthritis    • GERD (gastroesophageal reflux disease)    • Interstitial cystitis    • Migraine    • Neck pain    • PONV (postoperative nausea and vomiting)    • Seasonal allergies    • Wears glasses          PROCEDURES:  C5-6 ACDF, exploration of prior C6-7 ACDF    BRIEF HOSPITAL COURSE:  Ms. Kong is a 39 y.o. female who underwent a C6-7 ACDF in November 2017.  The patient continued to have residual left arm and shoulder pain.  CT myelogram of her cervical spine demonstrated nerve root compromise at C5-6 on the left side.  As such, the patient underwent an uncomplicated C5-6 ACDF and exploration of prior C6-7 ACDF on 8/22/2019.  A ZOE drain was left in place following the procedure.  There was minimal output from ZOE drain and it was removed on POD 1.    Over the course of her hospital stay, vitals remained stable and her post-op dressing was clean, dry and intact.  Motor and sensory function were found to be intact throughout.  She was ambulatory, voiding independently, and tolerated PO without associated nausea or vomiting.  Pain was well controlled with oral medications at the time of discharge on 8/23/2019.      DISCHARGE MEDICATIONS:     Discharge Medications      New Medications      Instructions Start Date   cyclobenzaprine 10 MG tablet  Commonly known as:  FLEXERIL   10 mg, Oral, 3 Times Daily PRN      ondansetron 4 MG tablet  Commonly known as:  ZOFRAN   4 mg, Oral, Every 6 Hours PRN      oxyCODONE-acetaminophen 7.5-325 MG per tablet  Commonly known as:  PERCOCET   1 tablet,  Oral, Every 4 Hours PRN         Changes to Medications      Instructions Start Date   Gel Base gel  What changed:    · how much to take  · when to take this  · reasons to take this  · additional instructions   CAPSAICIN 0.001% IMIPRAMINE 3% LIDOCAINE 10% MANNITOL 20% MELOXICAM 0.1% PRILOCAINE 2%. APPLY 1-2 G TO AFFECTED AREA 3-4 TIMES DAILY      varenicline 0.5 MG X 11 & 1 MG X 42 tablet  Commonly known as:  CHANTIX STARTING MONTH EMERY  What changed:    · how much to take  · how to take this  · when to take this  · additional instructions   0.5mg daily days 1-3, 0.5mg twice daily days 4-7.Then1 mg twice daily for a total of 12weeks.Set a quit date between days 7-14.         Continue These Medications      Instructions Start Date   amitriptyline 150 MG tablet  Commonly known as:  ELAVIL   150 mg, Oral, Nightly      buPROPion  MG 24 hr tablet  Commonly known as:  WELLBUTRIN XL   300 mg, Oral, Every Morning      ELMIRON 100 MG capsule  Generic drug:  pentosan polysulfate   100 mg, Oral, 3 Times Daily Before Meals      esomeprazole 40 MG capsule  Commonly known as:  nexIUM   40 mg, Oral, Every Morning Before Breakfast      gabapentin 100 MG capsule  Commonly known as:  NEURONTIN   TAKE ONE CAPSULE BY MOUTH THREE TIMES A DAY      MACROBID 100 MG capsule  Generic drug:  nitrofurantoin (macrocrystal-monohydrate)   100 mg, Oral, 2 Times Daily      montelukast 10 MG tablet  Commonly known as:  SINGULAIR   10 mg, Oral, Nightly      topiramate 100 MG tablet  Commonly known as:  TOPAMAX   100 mg, Oral, Nightly      uribel 118 MG capsule capsule   1 capsule, Oral, 3 Times Daily PRN      vitamin B-6 100 MG tablet  Commonly known as:  PYRIDOXINE   100 mg, Oral, Daily         Stop These Medications    acetaminophen-codeine 300-30 MG per tablet  Commonly known as:  TYLENOL #3     chlorhexidine 4 % external liquid  Commonly known as:  HIBICLENS     DUEXIS 800-26.6 MG tablet  Generic drug:  Ibuprofen-Famotidine     tiZANidine 2  MG tablet  Commonly known as:  ZANAFLEX          Discharge to home    ACTIVITY:  As tolerated  Avoid strenuous activity  Avoid repetitive bending, lifting and twisting    DIET:  As tolerated    FOLLOW UP:  2 weeks with neurosurgery PA    Follow-up Information     Keon Zavala MD Follow up.    Specialty:  Urology  Contact information:  [address] [place] [address]  [phone]             Lilia Shepard PA-C Follow up in 2 week(s).    Specialties:  Neurosurgery, Physician Assistant  Contact information:  [facility] [address] [address] [phone]

## 2019-09-09 ENCOUNTER — OFFICE VISIT (OUTPATIENT)
Dept: NEUROSURGERY | Facility: CLINIC | Age: 39
End: 2019-09-09

## 2019-09-09 ENCOUNTER — DOCUMENTATION (OUTPATIENT)
Dept: NEUROSURGERY | Facility: CLINIC | Age: 39
End: 2019-09-09

## 2019-09-09 VITALS
DIASTOLIC BLOOD PRESSURE: 64 MMHG | TEMPERATURE: 97.2 F | SYSTOLIC BLOOD PRESSURE: 102 MMHG | WEIGHT: 147 LBS | HEIGHT: 66 IN | BODY MASS INDEX: 23.63 KG/M2

## 2019-09-09 DIAGNOSIS — Z98.1 STATUS POST CERVICAL SPINAL FUSION: Primary | ICD-10-CM

## 2019-09-09 PROCEDURE — 99024 POSTOP FOLLOW-UP VISIT: CPT | Performed by: PHYSICIAN ASSISTANT

## 2019-09-09 NOTE — PROGRESS NOTES
Called in Tramadol 50mg #30 0RF and gabapentin to pharmacy on file.    Daniela Fontanez, CMA: 11:02 AM

## 2019-10-09 ENCOUNTER — OFFICE VISIT (OUTPATIENT)
Dept: NEUROSURGERY | Facility: CLINIC | Age: 39
End: 2019-10-09

## 2019-10-09 ENCOUNTER — HOSPITAL ENCOUNTER (OUTPATIENT)
Dept: GENERAL RADIOLOGY | Facility: HOSPITAL | Age: 39
Discharge: HOME | End: 2019-10-09
Admitting: PHYSICIAN ASSISTANT

## 2019-10-09 VITALS
HEIGHT: 66 IN | BODY MASS INDEX: 23.88 KG/M2 | SYSTOLIC BLOOD PRESSURE: 102 MMHG | DIASTOLIC BLOOD PRESSURE: 70 MMHG | TEMPERATURE: 97.4 F | WEIGHT: 148.6 LBS

## 2019-10-09 DIAGNOSIS — Z98.1 STATUS POST CERVICAL SPINAL FUSION: Primary | ICD-10-CM

## 2019-10-09 PROCEDURE — 72040 X-RAY EXAM NECK SPINE 2-3 VW: CPT

## 2019-10-09 PROCEDURE — 99024 POSTOP FOLLOW-UP VISIT: CPT | Performed by: PHYSICIAN ASSISTANT

## 2019-10-09 RX ORDER — CARISOPRODOL 350 MG/1
350 TABLET ORAL 2 TIMES DAILY PRN
Qty: 60 TABLET | Refills: 0 | OUTPATIENT
Start: 2019-10-09 | End: 2020-01-22

## 2019-10-09 NOTE — PROGRESS NOTES
Subjective     Chief Complaint: Postoperative follow-up    Patient ID: Mateusz Jordan is a 39 y.o. female is here today for follow-up.    History of Present Illness    This is a 39-year-old woman who presents today in postoperative follow-up.  She is approximately 6 weeks status post C5-6 ACDF and exploration of prior C6-7 ACDF.  Patient continues to have quite a bit in the way of neck and shoulder muscle spasms.  She states she gets occasional headaches as a result of this.  She has been taking tramadol for pain relief although she often requires Tylenol and ibuprofen in addition.  Flexeril has not been helping her spasms.  Her incision is healing well.      The following portions of the patient's history were reviewed and updated as appropriate: allergies, current medications, past family history, past medical history, past social history, past surgical history and problem list.    Family history:   Family History   Problem Relation Age of Onset   • No Known Problems Mother    • Throat cancer Father        Social history:   Social History     Socioeconomic History   • Marital status:      Spouse name: Not on file   • Number of children: Not on file   • Years of education: Not on file   • Highest education level: Not on file   Tobacco Use   • Smoking status: Former Smoker     Packs/day: 0.50     Years: 26.00     Pack years: 13.00     Types: Cigarettes     Last attempt to quit: 2019     Years since quittin.1   • Smokeless tobacco: Never Used   Substance and Sexual Activity   • Alcohol use: No   • Drug use: No   • Sexual activity: Defer       Review of Systems   Eyes: Negative.    Respiratory: Negative.    Cardiovascular: Negative.    Gastrointestinal: Negative.    Endocrine: Negative.    Genitourinary: Negative.    Musculoskeletal: Positive for myalgias, neck pain and neck stiffness.   Skin: Negative.    Allergic/Immunologic: Negative.    Neurological: Positive for dizziness, weakness,  "light-headedness and headaches.   Hematological: Negative.    Psychiatric/Behavioral: Negative.        Objective   Blood pressure 102/70, temperature 97.4 °F (36.3 °C), temperature source Temporal, height 167.6 cm (65.98\"), weight 67.4 kg (148 lb 9.6 oz), not currently breastfeeding.  Body mass index is 24 kg/m².    Physical Exam   Constitutional: She is oriented to person, place, and time. She appears well-developed and well-nourished. No distress.   HENT:   Head:       Pulmonary/Chest: Effort normal.   Neurological: She is alert and oriented to person, place, and time. Gait normal. GCS eye subscore is 4. GCS verbal subscore is 5. GCS motor subscore is 6.   Skin: Skin is warm and dry. She is not diaphoretic. No erythema.   Psychiatric: She has a normal mood and affect.         Assessment/Plan     Ms. Worley presents today in postoperative follow-up 6 weeks status post C5-6 ACDF.  I have referred the patient for physical therapy.  She was given a small refill of Norco 5 and Soma to be taken as needed. She will return to work in 2 weeks.  She was instructed to call with new or worsening symptoms.  Otherwise, we will follow-up with her in 3 months after completing PT.    Mateusz was seen today for post-op.    Diagnoses and all orders for this visit:    Status post cervical spinal fusion  -     Ambulatory Referral to Physical Therapy Evaluate and treat, POST OP        Return in about 3 months (around 1/9/2020), or if symptoms worsen or fail to improve.             Lilia Shepard PA-C        "

## 2019-10-15 DIAGNOSIS — M47.22 CERVICAL SPONDYLOSIS WITH RADICULOPATHY: Primary | ICD-10-CM

## 2019-10-16 RX ORDER — GABAPENTIN 300 MG/1
CAPSULE ORAL
Qty: 90 CAPSULE | Refills: 2 | OUTPATIENT
Start: 2019-10-16 | End: 2022-11-25

## 2019-10-16 NOTE — TELEPHONE ENCOUNTER
Provider:  Jeremiah  Caller:  Automated refill request  Surgery:    ACDF C5/6 expl C6/7  08/22/19  ACDF  C6/7 11/21/17    Last visit:  10/09/19  Next visit: 01/08/20     Reason for call:         Requested Prescriptions     Pending Prescriptions Disp Refills   • gabapentin (NEURONTIN) 300 MG capsule [Pharmacy Med Name: GABAPENTIN 300 MG CAPS 300 CAP] 90 capsule 2     Sig: TAKE ONE CAPSULE BY MOUTH THREE TIMES A DAY     JOSEFINA:     08/19/2019 Gabapentin 100MG 1980 90 30 RENITA ROSS [facility] 3  08/23/2019 Oxycodone/Acetaminophen  325MG/7.5MG  1980 50 [address] CORNELL MICHAEL  [facility] 63 3  09/09/2019 Gabapentin 300MG 1980 90 30 CORNELL MICHAEL  [facility] 3  09/09/2019 Tramadol Hcl 50MG 1980 30 15 CORNELL MICHAEL  [facility] 10 3  10/09/2019 Carisoprodol 350MG 1980 60 30 CORNELL MICHAEL  [facility] [address] [place] [address] 3  10/09/2019 Hydrocodone/Acetaminophen  325MG/5MG  1980 25 13 CORNELL MICHAEL  [facility] [address] [place] [address] 10 3

## 2019-12-03 RX ORDER — VARENICLINE TARTRATE 1 MG/1
TABLET, FILM COATED ORAL
Qty: 56 TABLET | Refills: 1 | Status: SHIPPED | OUTPATIENT
Start: 2019-12-03 | End: 2022-11-25

## 2020-01-15 RX ORDER — CARISOPRODOL 350 MG/1
350 TABLET ORAL 2 TIMES DAILY PRN
Qty: 60 TABLET | Refills: 0 | OUTPATIENT
Start: 2020-01-15

## 2020-01-15 NOTE — TELEPHONE ENCOUNTER
Soma is often given for a short period of time after surgery if patients need it. She is >3mos out and did not keep her appointment last week.  She should be past the time of needing this medication. We will not renew it

## 2020-01-15 NOTE — TELEPHONE ENCOUNTER
Provider:  Jeremiah  Caller: González   Time of call:  12:00PM   Phone #:  [phone]  Surgery:  anterior cervical discectomy and fusion C5-6, exploration of C6-7  Surgery Date:  08/22/2019  Last visit:   10/09/2019  Next visit: 01/22/2020    Reason for call:       González from pharmacy [facility] regarding patient Soma Rx.  Returned the call, they advised that the Soma did not have any refills left.   Can we renew this?

## 2020-01-22 ENCOUNTER — OFFICE VISIT (OUTPATIENT)
Dept: NEUROSURGERY | Facility: CLINIC | Age: 40
End: 2020-01-22

## 2020-01-22 VITALS
WEIGHT: 158 LBS | DIASTOLIC BLOOD PRESSURE: 66 MMHG | BODY MASS INDEX: 25.39 KG/M2 | SYSTOLIC BLOOD PRESSURE: 100 MMHG | HEIGHT: 66 IN

## 2020-01-22 DIAGNOSIS — M54.2 CERVICALGIA: ICD-10-CM

## 2020-01-22 DIAGNOSIS — Z98.1 S/P CERVICAL SPINAL FUSION: Primary | ICD-10-CM

## 2020-01-22 PROCEDURE — 99213 OFFICE O/P EST LOW 20 MIN: CPT | Performed by: PHYSICIAN ASSISTANT

## 2020-01-22 RX ORDER — METHOCARBAMOL 750 MG/1
750 TABLET, FILM COATED ORAL 3 TIMES DAILY PRN
Qty: 90 TABLET | Refills: 0 | Status: SHIPPED | OUTPATIENT
Start: 2020-01-22 | End: 2020-04-20

## 2020-01-22 RX ORDER — MELOXICAM 15 MG/1
15 TABLET ORAL DAILY
Qty: 60 TABLET | Refills: 2 | Status: SHIPPED | OUTPATIENT
Start: 2020-01-22 | End: 2020-07-28

## 2020-01-22 NOTE — PROGRESS NOTES
Subjective     Chief Complaint: Neck pain    Patient ID: Mateusz Jordan is a 39 y.o. female is here today for follow-up.    History of Present Illness    This is a 39-year-old woman who underwent a C6/7 ACDF in 2017 and a C5-6 ACDF in 2019.  Patient reports today complaining of aching neck pain.  She states her preoperative radiculopathy has completely resolved.  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  She denies any radiating pain into upper extremities.  Denies any numbness, weakness, saddle numbness, gait instability or bowel or bladder incontinence.  She has completed several sessions of physical therapy and feels that her symptoms improve after PT and stretching, however they are still very bothersome.  She also takes NSAIDs and muscle relaxants as needed.    The following portions of the patient's history were reviewed and updated as appropriate: allergies, current medications, past family history, past medical history, past social history, past surgical history and problem list.    Family history:   Family History   Problem Relation Age of Onset   • No Known Problems Mother    • Throat cancer Father      Past Medical History:   Diagnosis Date   • Arthritis    • GERD (gastroesophageal reflux disease)    • Interstitial cystitis    • Migraine    • Neck pain    • PONV (postoperative nausea and vomiting)    • Seasonal allergies    • Wears glasses        Social history:   Social History     Socioeconomic History   • Marital status:      Spouse name: Not on file   • Number of children: Not on file   • Years of education: Not on file   • Highest education level: Not on file   Tobacco Use   • Smoking status: Former Smoker     Packs/day: 0.50     Years: 26.00     Pack years: 13.00     Types: Cigarettes     Last attempt to quit: 2019     Years since quittin.4   • Smokeless tobacco: Never Used    Substance and Sexual Activity   • Alcohol use: No   • Drug use: No   • Sexual activity: Defer       Review of Systems   Constitutional: Negative for activity change, appetite change, chills, diaphoresis, fatigue, fever and unexpected weight change.   HENT: Positive for sinus pressure. Negative for congestion, dental problem, drooling, ear discharge, ear pain, facial swelling, hearing loss, mouth sores, nosebleeds, postnasal drip, rhinorrhea, sinus pain, sneezing, sore throat, tinnitus, trouble swallowing and voice change.    Eyes: Negative for photophobia, pain, discharge, redness, itching and visual disturbance.   Respiratory: Negative for apnea, cough, choking, chest tightness, shortness of breath, wheezing and stridor.    Cardiovascular: Negative for chest pain, palpitations and leg swelling.   Gastrointestinal: Negative for abdominal distention, abdominal pain, anal bleeding, blood in stool, constipation, diarrhea, nausea, rectal pain and vomiting.   Endocrine: Negative for cold intolerance, heat intolerance, polydipsia, polyphagia and polyuria.   Genitourinary: Negative for decreased urine volume, difficulty urinating, dyspareunia, dysuria, enuresis, flank pain, frequency, genital sores, hematuria, menstrual problem, pelvic pain, urgency, vaginal bleeding, vaginal discharge and vaginal pain.   Musculoskeletal: Positive for back pain, myalgias and neck pain. Negative for arthralgias, gait problem, joint swelling and neck stiffness.   Skin: Negative for color change, pallor, rash and wound.   Allergic/Immunologic: Negative for environmental allergies, food allergies and immunocompromised state.   Neurological: Positive for headaches. Negative for dizziness, tremors, seizures, syncope, facial asymmetry, speech difficulty, weakness, light-headedness and numbness.   Hematological: Negative for adenopathy. Does not bruise/bleed easily.   Psychiatric/Behavioral: Negative for agitation, behavioral problems,  "confusion, decreased concentration, dysphoric mood, hallucinations, self-injury, sleep disturbance and suicidal ideas. The patient is not nervous/anxious and is not hyperactive.        Objective   Blood pressure 100/66, height 167.6 cm (65.98\"), weight 71.7 kg (158 lb), not currently breastfeeding.  Body mass index is 25.51 kg/m².    Physical Exam   Constitutional: She is oriented to person, place, and time. She appears well-developed and well-nourished. No distress.   HENT:   Head: Normocephalic and atraumatic.   Eyes: Pupils are equal, round, and reactive to light. Conjunctivae are normal.   Neck: Muscular tenderness present. No spinous process tenderness present. No neck rigidity.       Pulmonary/Chest: Effort normal.   Neurological: She is alert and oriented to person, place, and time. She has normal strength and normal reflexes. No sensory deficit. She exhibits normal muscle tone. She displays a negative Romberg sign. Gait normal. GCS eye subscore is 4. GCS verbal subscore is 5.   Skin: Skin is warm and dry. She is not diaphoretic.   Psychiatric: She has a normal mood and affect. Her behavior is normal.         Assessment/Plan     This is a 39-year-old woman who has a history of a C6-7 and C5-6 ACDF and 2017 and 2019 respectively.  Surgery was successful in resolving her radiculopathy, however she continues to have neck pain that is particularly bothersome after sitting at her desk job all day.  She will continue with physical therapy as this has proven to be helpful.  I have also recommended she see pain management for consideration of injections for her chronic neck pain.  We discussed ergonomic desk and body mechanics for neck pain.  She will continue taking NSAIDs and muscle relaxants as needed.  Signs and symptoms of cervical radiculopathy and myelopathy discussed with the patient.  I have asked her to call back after seen and evaluated by PM, or sooner should she have any new or worsening " symptoms.    Diagnoses and all orders for this visit:    S/P cervical spinal fusion    Cervicalgia    Other orders  -     meloxicam (MOBIC) 15 MG tablet; Take 1 tablet by mouth Daily.  -     methocarbamol (ROBAXIN) 750 MG tablet; Take 1 tablet by mouth 3 (Three) Times a Day As Needed for Muscle Spasms for up to 90 doses.        Return for After seeing pain management.             Lilia Shepard PA-C

## 2020-01-24 ENCOUNTER — TELEPHONE (OUTPATIENT)
Dept: NEUROSURGERY | Facility: CLINIC | Age: 40
End: 2020-01-24

## 2020-01-24 NOTE — TELEPHONE ENCOUNTER
Provider:  CAMILLE Shepard PA-C  Caller: Pharmacy  Time of call:     Phone #:  [phone]  Surgery:  ACDF C5-6  Surgery Date: 08/22/2019   Last visit:   01/22/2020  Next visit: None    JOSEFINA:         Reason for call:     Pharmacy called stating patient advised them she was supposed to be picking up a Rx for Robaxin as well. Pharmacy stated they never received this medication. Medication was renewed in the chart on 01/22/2020, and set as printed. Patient never received the Rx.   Called [facility], speaking with González. Medication was given to them verbally over the phone.

## 2020-02-18 ENCOUNTER — TELEPHONE (OUTPATIENT)
Dept: NEUROSURGERY | Facility: CLINIC | Age: 40
End: 2020-02-18

## 2020-02-18 NOTE — TELEPHONE ENCOUNTER
Received FMLA / STD forms for the patient. I didn't see anything specifically noted in Lilia's last office note.     How to proceed with these?

## 2020-04-20 RX ORDER — METHOCARBAMOL 750 MG/1
TABLET, FILM COATED ORAL
Qty: 90 TABLET | Refills: 0 | Status: SHIPPED | OUTPATIENT
Start: 2020-04-20 | End: 2020-07-28

## 2020-04-20 NOTE — TELEPHONE ENCOUNTER
Provider: Jeremiah  Caller:  Automated refill request  Surgery:  ACDF   Surgery Date:  08/22/19  Last visit:  01/22/20  Next visit: NA    Reason for call:         Requested Prescriptions     Pending Prescriptions Disp Refills   • methocarbamol (ROBAXIN) 750 MG tablet [Pharmacy Med Name: METHOCARBAMOL 750 MG TABS 750 TAB] 90 tablet 0     Sig: TAKE 1 TABLET BY MOUTH THREE TIMES A DAY AS NEEDED

## 2020-07-28 RX ORDER — METHOCARBAMOL 750 MG/1
TABLET, FILM COATED ORAL
Qty: 90 TABLET | Refills: 0 | Status: SHIPPED | OUTPATIENT
Start: 2020-07-28 | End: 2020-09-02

## 2020-07-28 RX ORDER — MELOXICAM 15 MG/1
TABLET ORAL
Qty: 60 TABLET | Refills: 2 | Status: SHIPPED | OUTPATIENT
Start: 2020-07-28 | End: 2021-01-27

## 2020-07-28 NOTE — TELEPHONE ENCOUNTER
Provider: Jeremaih  Caller:  Automated refill request  Surgery:  ACDF   Surgery Date:  08/22/19  Last visit:  01/22/20  Next visit: NA     Reason for call:   Requested Prescriptions     Pending Prescriptions Disp Refills   • meloxicam (MOBIC) 15 MG tablet [Pharmacy Med Name: MELOXICAM 15 MG TABLET 15 TAB] 60 tablet 2     Sig: TAKE 1 TABLET BY MOUTH EVERY DAY

## 2020-09-02 RX ORDER — METHOCARBAMOL 750 MG/1
TABLET, FILM COATED ORAL
Qty: 90 TABLET | Refills: 0 | Status: SHIPPED | OUTPATIENT
Start: 2020-09-02 | End: 2020-11-30

## 2020-09-02 NOTE — TELEPHONE ENCOUNTER
Requested Prescriptions     Pending Prescriptions Disp Refills   • methocarbamol (ROBAXIN) 750 MG tablet [Pharmacy Med Name: METHOCARBAMOL 750 MG TABS 750 TAB] 90 tablet 0     Sig: TAKE 1 TABLET BY MOUTH THREE TIMES A DAY AS NEEDED

## 2020-11-30 RX ORDER — METHOCARBAMOL 750 MG/1
TABLET, FILM COATED ORAL
Qty: 90 TABLET | Refills: 0 | Status: SHIPPED | OUTPATIENT
Start: 2020-11-30 | End: 2020-12-17

## 2020-12-17 RX ORDER — METHOCARBAMOL 750 MG/1
TABLET, FILM COATED ORAL
Qty: 90 TABLET | Refills: 0 | Status: SHIPPED | OUTPATIENT
Start: 2020-12-17 | End: 2021-02-26

## 2020-12-17 NOTE — TELEPHONE ENCOUNTER
Requested Prescriptions     Pending Prescriptions Disp Refills   • methocarbamol (ROBAXIN) 750 MG tablet [Pharmacy Med Name: METHOCARBAMOL 750 MG TABS 750 Tablet] 90 tablet 0     Sig: TAKE 1 TABLET BY MOUTH THREE TIMES A DAY AS NEEDED

## 2021-01-11 NOTE — ADDENDUM NOTE
Addended by: DEEPA PARK on: 10/15/2018 04:33 PM     Modules accepted: Orders     Interval/Overnight Events:    ===========================RESPIRATORY==========================  RR: 36 (01-11-21 @ 08:00) (27 - 36)  SpO2: 98% (01-11-21 @ 08:00) (95% - 100%)  End Tidal CO2:    Respiratory Support: Mode: SIMV with PS, RR (machine): 35, FiO2: 35, PEEP: 8, PS: 10, ITime: 0.6, MAP: 14, PIP: 30    ipratropium 0.02% for Nebulization - Peds 250 MICROGram(s) Inhalation every 8 hours  sodium chloride 3% for Nebulization - Peds 3 milliLiter(s) Nebulizer every 8 hours  [x] Airway Clearance Discussed  Extubation Readiness:  [ ] Not Applicable     [ ] Discussed and Assessed  Comments:    =========================CARDIOVASCULAR========================  HR: 130 (01-11-21 @ 08:00) (113 - 147)  BP: 90/43 (01-11-21 @ 05:00) (87/44 - 112/63)    Patient Care Access:  propranolol  Oral Liquid - Peds 4 milliGRAM(s) Oral every 8 hours  Comments:    =====================HEMATOLOGY/ONCOLOGY=====================  Transfusions:	[ ] PRBC	[ ] Platelets	[ ] FFP		[ ] Cryoprecipitate  DVT Prophylaxis:  Comments:    ========================INFECTIOUS DISEASE=======================  T(C): 36.6 (01-11-21 @ 05:00), Max: 37.2 (01-10-21 @ 14:00)  T(F): 97.8 (01-11-21 @ 05:00), Max: 98.9 (01-10-21 @ 14:00)  [ ] Cooling [place] being used. Target Temperature:    azithromycin  Oral Liquid - Peds 60 milliGRAM(s) Oral <User Schedule>    ==================FLUIDS/ELECTROLYTES/NUTRITION=================  I&O's Summary    10 Juliocesar 2021 07:01  -  11 Jan 2021 07:00  --------------------------------------------------------  IN: 912 mL / OUT: 611 mL / NET: 301 mL    11 Jan 2021 07:01  -  11 Jan 2021 08:03  --------------------------------------------------------  IN: 38 mL / OUT: 0 mL / NET: 38 mL    Diet:   [ ] NGT		[ ] NDT		[ ] GT		[ ] GJT    cholecalciferol Oral Liquid - Peds 200 Unit(s) Oral daily  ferrous sulfate Oral Liquid - Peds 6 milliGRAM(s) Elemental Iron Oral daily  lansoprazole   Oral  Liquid - Peds 7.5 milliGRAM(s) Oral daily  multivitamin Oral Drops - Peds 1 milliLiter(s) Oral daily  simethicone Oral Drops - Peds 20 milliGRAM(s) Oral four times a day PRN  Comments:    ==========================NEUROLOGY===========================  [ ] SBS:		[ ] MARTHA-1:	[ ] BIS:	[ ] CAPD:  acetaminophen   Oral Liquid - Peds. 80 milliGRAM(s) Oral every 6 hours PRN  [x] Adequacy of sedation and pain control has been assessed and adjusted  Comments:    OTHER MEDICATIONS:  bethanechol Oral Liquid - Peds 0.7 milliGRAM(s) Oral every 6 hours  ciprofloxacin/dexamethasone Otic Suspension - Peds 5 Drop(s) IntraTracheal daily  lactobacillus Oral Powder (CULTURELLE KIDS) - Peds 1 Packet(s) Oral daily  zinc oxide 12.8% Topical Ointment - Peds 1 Application(s) Topical five times a day PRN    =========================PATIENT CARE==========================  [ ] There are pressure ulcers/areas of breakdown that are being addressed.  [x] Preventative measures are being taken to decrease risk for skin breakdown.  [x] Necessity of urinary, arterial, and venous catheters discussed    =========================PHYSICAL EXAM=========================  GENERAL: In no acute distress  RESPIRATORY: Lungs clear to auscultation bilaterally. Good aeration. No rales, rhonchi, retractions or wheezing. Effort even and unlabored.  CARDIOVASCULAR: Regular rate and rhythm. Normal S1/S2. No murmurs, rubs, or gallop. Capillary refill < 2 seconds. Distal pulses 2+ and equal.  ABDOMEN: Soft, non-distended. Bowel sounds present. No palpable hepatosplenomegaly.  SKIN: No rash.  EXTREMITIES: Warm and well perfused. No gross extremity deformities.  NEUROLOGIC: Alert and oriented. No acute change from baseline exam.    ===============================================================  LABS:    RECENT CULTURES:      IMAGING STUDIES:    Parent/Guardian is at the bedside:	[ ] Yes	[ ] No  Patient and Parent/Guardian updated as to the progress/plan of care:	[ ] Yes	[ ] No    [ ] The patient remains in critical and unstable condition, and requires ICU care and monitoring, total critical care time spent by myself, the attending physician was __ minutes, excluding procedure time.  [ ] The patient is improving but requires continued monitoring and adjustment of therapy Interval/Overnight Events:    ===========================RESPIRATORY==========================  RR: 36 (01-11-21 @ 08:00) (27 - 36)  SpO2: 98% (01-11-21 @ 08:00) (95% - 100%)  End Tidal CO2:    Respiratory Support: Mode: SIMV with PS, RR (machine): 35, FiO2: 35, PEEP: 8, PS: 10, ITime: 0.6, MAP: 14, PIP: 30    ipratropium 0.02% for Nebulization - Peds 250 MICROGram(s) Inhalation every 8 hours  sodium chloride 3% for Nebulization - Peds 3 milliLiter(s) Nebulizer every 8 hours  [x] Airway Clearance Discussed  Extubation Readiness:  [x] Not Applicable     [ ] Discussed and Assessed  Comments:    =========================CARDIOVASCULAR========================  HR: 130 (01-11-21 @ 08:00) (113 - 147)  BP: 90/43 (01-11-21 @ 05:00) (87/44 - 112/63)    Patient Care Access:  propranolol  Oral Liquid - Peds 4 milliGRAM(s) Oral every 8 hours  Comments:    =====================HEMATOLOGY/ONCOLOGY=====================  Transfusions:	[ ] PRBC	[ ] Platelets	[ ] FFP		[ ] Cryoprecipitate  DVT Prophylaxis: DVT prophylaxis not indicated as patient is sufficiently mobile and/or low risk   Comments:    ========================INFECTIOUS DISEASE=======================  T(C): 36.6 (01-11-21 @ 05:00), Max: 37.2 (01-10-21 @ 14:00)  T(F): 97.8 (01-11-21 @ 05:00), Max: 98.9 (01-10-21 @ 14:00)  [ ] Cooling [place] being used. Target Temperature:    azithromycin  Oral Liquid - Peds 60 milliGRAM(s) Oral <User Schedule>    ==================FLUIDS/ELECTROLYTES/NUTRITION=================  I&O's Summary    10 Juliocesar 2021 07:01  -  11 Jan 2021 07:00  --------------------------------------------------------  IN: 912 mL / OUT: 611 mL / NET: 301 mL    11 Jan 2021 07:01  -  11 Jan 2021 08:03  --------------------------------------------------------  IN: 38 mL / OUT: 0 mL / NET: 38 mL    Diet:   [ ] NGT		[ ] NDT		[ ] GT		[ ] GJT    cholecalciferol Oral Liquid - Peds 200 Unit(s) Oral daily  ferrous sulfate Oral Liquid - Peds 6 milliGRAM(s) Elemental Iron Oral daily  lansoprazole   Oral  Liquid - Peds 7.5 milliGRAM(s) Oral daily  multivitamin Oral Drops - Peds 1 milliLiter(s) Oral daily  simethicone Oral Drops - Peds 20 milliGRAM(s) Oral four times a day PRN  Comments:    ==========================NEUROLOGY===========================  [ ] SBS:		[ ] MARTHA-1:	[ ] BIS:	[ ] CAPD:  acetaminophen   Oral Liquid - Peds. 80 milliGRAM(s) Oral every 6 hours PRN  [x] Adequacy of sedation and pain control has been assessed and adjusted  Comments:    OTHER MEDICATIONS:  bethanechol Oral Liquid - Peds 0.7 milliGRAM(s) Oral every 6 hours  ciprofloxacin/dexamethasone Otic Suspension - Peds 5 Drop(s) IntraTracheal daily  lactobacillus Oral Powder (CULTURELLE KIDS) - Peds 1 Packet(s) Oral daily  zinc oxide 12.8% Topical Ointment - Peds 1 Application(s) Topical five times a day PRN    =========================PATIENT CARE==========================  [ ] There are pressure ulcers/areas of breakdown that are being addressed.  [x] Preventative measures are being taken to decrease risk for skin breakdown.  [x] Necessity of urinary, arterial, and venous catheters discussed    =========================PHYSICAL EXAM=========================  GENERAL: In no acute distress  RESPIRATORY: Good aeration bilaterally with diffuse rhonchi. + Subcostal retractions.  CARDIOVASCULAR: Regular rate and rhythm. Normal S1/S2. No murmurs, rubs, or gallop. Capillary refill < 2 seconds. Distal pulses 2+ and equal.  ABDOMEN: Soft, non-distended. Bowel sounds present. No palpable hepatosplenomegaly. GT CDI  SKIN: No rash.  EXTREMITIES: Warm and well perfused. No gross extremity deformities.  NEUROLOGIC: No acute change from baseline exam.    ===============================================================  IMAGING STUDIES: ruthie< from: Xray Chest 1 View- PORTABLE-Urgent (Xray Chest 1 View- PORTABLE-Urgent .) (01.11.21 @ 11:41) >  FINDINGS:  Tracheostomy tube is in place with distal tip within the mid trachea above the nneka.  Cardiothymic silhouette is stable.  Left perihilar airspace disease.  There is no pneumothorax or pleural effusion.    Parent/Guardian is at the bedside:	[ x] Yes	[ ] No  Patient and Parent/Guardian updated as to the progress/plan of care:	[x ] Yes	[ ] No    [x ] The patient remains in critical and unstable condition, and requires ICU care and monitoring, total critical care time spent by myself, the attending physician was __ minutes, excluding procedure time.  [ ] The patient is improving but requires continued monitoring and adjustment of therapy

## 2021-01-27 RX ORDER — MELOXICAM 15 MG/1
TABLET ORAL
Qty: 30 TABLET | Refills: 5 | Status: SHIPPED | OUTPATIENT
Start: 2021-01-27

## 2021-01-27 NOTE — TELEPHONE ENCOUNTER
Provider:  Jeremiah  Caller:  Automated refill request  Surgery:  ACDF C6/7 2017 ; ACDF C5/6 & C6/7 08/22/2019  Last visit:  01/22/20  Next visit: NA    Reason for call:         Requested Prescriptions     Pending Prescriptions Disp Refills   • meloxicam (MOBIC) 15 MG tablet [Pharmacy Med Name: MELOXICAM 15 MG TABLET 15 Tablet] 30 tablet 5     Sig: TAKE 1 TABLET BY MOUTH EVERY DAY

## 2021-02-26 RX ORDER — METHOCARBAMOL 750 MG/1
TABLET, FILM COATED ORAL
Qty: 90 TABLET | Refills: 0 | Status: SHIPPED | OUTPATIENT
Start: 2021-02-26 | End: 2021-03-29

## 2021-03-29 ENCOUNTER — TELEPHONE (OUTPATIENT)
Dept: NEUROSURGERY | Facility: CLINIC | Age: 41
End: 2021-03-29

## 2021-03-29 RX ORDER — METHOCARBAMOL 750 MG/1
TABLET, FILM COATED ORAL
Qty: 90 TABLET | Refills: 0 | Status: SHIPPED | OUTPATIENT
Start: 2021-03-29 | End: 2021-04-27

## 2021-03-29 NOTE — TELEPHONE ENCOUNTER
Provider:  Jeremiah  Caller: pharmacy  Surgery:  Anterior cervical discectomy  Surgery Date:  8-  Last visit:   1-  Next visit: maría elena ROCHA:         Reason for call:   Pt. Is needing her   Robaxin refilled if possible.    Requested Prescriptions     Pending Prescriptions Disp Refills   • methocarbamol (ROBAXIN) 750 MG tablet [Pharmacy Med Name: METHOCARBAMOL 750 MG TABS 750 Tablet] 90 tablet 0     Sig: TAKE 1 TABLET BY MOUTH THREE TIMES A DAY AS NEEDED         I have called pt. And left a message  to see which pharmacy she is currently using.

## 2021-03-29 NOTE — TELEPHONE ENCOUNTER
Called pt and told her that her refill for her Robaxin was ready at the pharmacy when she wanted to pick it up. She thank you for the call back.

## 2021-04-26 DIAGNOSIS — M54.2 CERVICALGIA: Primary | ICD-10-CM

## 2021-04-26 DIAGNOSIS — M54.2 NECK PAIN: ICD-10-CM

## 2021-04-26 DIAGNOSIS — M89.8X1 CHRONIC SCAPULAR PAIN: ICD-10-CM

## 2021-04-26 DIAGNOSIS — G89.29 CHRONIC SCAPULAR PAIN: ICD-10-CM

## 2021-04-26 DIAGNOSIS — M47.22 CERVICAL SPONDYLOSIS WITH RADICULOPATHY: ICD-10-CM

## 2021-04-27 RX ORDER — METHOCARBAMOL 750 MG/1
TABLET, FILM COATED ORAL
Qty: 90 TABLET | Refills: 0 | Status: SHIPPED | OUTPATIENT
Start: 2021-04-27 | End: 2022-11-25

## 2021-04-27 RX ORDER — VARENICLINE TARTRATE 1 MG/1
TABLET, FILM COATED ORAL
Qty: 56 TABLET | Refills: 1 | OUTPATIENT
Start: 2021-04-27

## 2021-04-27 NOTE — TELEPHONE ENCOUNTER
Provider:  Jeremiah  Caller: Gus  Time of call:     Phone #:    Surgery:  ACDF  Surgery Date:  11/21/17  Last visit:   01/22/20  Next visit:     JOSEFINA:         Reason for call:     Please see refill request for Methocarbamol.

## 2021-07-20 DIAGNOSIS — M54.2 CERVICALGIA: ICD-10-CM

## 2021-07-20 DIAGNOSIS — M54.2 NECK PAIN: ICD-10-CM

## 2021-07-20 DIAGNOSIS — G89.29 CHRONIC SCAPULAR PAIN: ICD-10-CM

## 2021-07-20 DIAGNOSIS — M47.22 CERVICAL SPONDYLOSIS WITH RADICULOPATHY: ICD-10-CM

## 2021-07-20 DIAGNOSIS — M89.8X1 CHRONIC SCAPULAR PAIN: ICD-10-CM

## 2021-07-20 RX ORDER — METHOCARBAMOL 750 MG/1
TABLET, FILM COATED ORAL
Qty: 90 TABLET | Refills: 0 | OUTPATIENT
Start: 2021-07-20

## 2021-07-20 NOTE — TELEPHONE ENCOUNTER
Provider:  Simba  Caller: Pharmacy    Surgery:  C5-6 ACDF  Surgery Date:  8/22/2019  Last visit:  1/22/20   Next visit: PRN    Reason for call:       Requested Prescriptions     Pending Prescriptions Disp Refills   • methocarbamol (ROBAXIN) 750 MG tablet [Pharmacy Med Name: METHOCARBAMOL 750 MG TABS 750 Tablet] 90 tablet 0     Sig: TAKE 1 TABLET BY MOUTH THREE TIMES A DAY AS NEEDED         Pt not seen since 1/22/2020.

## 2021-07-20 NOTE — TELEPHONE ENCOUNTER
I called patient back and left a VM letting her know that she will have to get this from her PCP. Thank you.

## 2021-12-02 ENCOUNTER — LAB REQUISITION (OUTPATIENT)
Dept: LAB | Facility: HOSPITAL | Age: 41
End: 2021-12-02

## 2021-12-02 DIAGNOSIS — Z00.00 ENCOUNTER FOR GENERAL ADULT MEDICAL EXAMINATION WITHOUT ABNORMAL FINDINGS: ICD-10-CM

## 2021-12-02 LAB — SARS-COV-2 ORF1AB RESP QL NAA+PROBE: NOT DETECTED

## 2021-12-02 PROCEDURE — U0004 COV-19 TEST NON-CDC HGH THRU: HCPCS | Performed by: INTERNAL MEDICINE

## 2021-12-06 ENCOUNTER — AMBULATORY SURGICAL CENTER (OUTPATIENT)
Dept: URBAN - METROPOLITAN AREA SURGERY 20 | Facility: SURGERY | Age: 41
End: 2021-12-06

## 2021-12-06 ENCOUNTER — OFFICE (OUTPATIENT)
Dept: URBAN - METROPOLITAN AREA PATHOLOGY 4 | Facility: PATHOLOGY | Age: 41
End: 2021-12-06

## 2021-12-06 DIAGNOSIS — K64.0 FIRST DEGREE HEMORRHOIDS: ICD-10-CM

## 2021-12-06 DIAGNOSIS — K31.89 OTHER DISEASES OF STOMACH AND DUODENUM: ICD-10-CM

## 2021-12-06 DIAGNOSIS — K21.00 GASTRO-ESOPHAGEAL REFLUX DISEASE WITH ESOPHAGITIS, WITHOUT B: ICD-10-CM

## 2021-12-06 DIAGNOSIS — K62.5 HEMORRHAGE OF ANUS AND RECTUM: ICD-10-CM

## 2021-12-06 DIAGNOSIS — K21.9 GASTRO-ESOPHAGEAL REFLUX DISEASE WITHOUT ESOPHAGITIS: ICD-10-CM

## 2021-12-06 DIAGNOSIS — K22.89 OTHER SPECIFIED DISEASE OF ESOPHAGUS: ICD-10-CM

## 2021-12-06 LAB
GI HISTOLOGY: A. SELECT: (no result)
GI HISTOLOGY: B. SELECT: (no result)
GI HISTOLOGY: PDF REPORT: (no result)

## 2021-12-06 PROCEDURE — 45378 DIAGNOSTIC COLONOSCOPY: CPT | Performed by: INTERNAL MEDICINE

## 2021-12-06 PROCEDURE — 88305 TISSUE EXAM BY PATHOLOGIST: CPT | Performed by: INTERNAL MEDICINE

## 2021-12-06 PROCEDURE — 43239 EGD BIOPSY SINGLE/MULTIPLE: CPT | Performed by: INTERNAL MEDICINE

## 2021-12-06 NOTE — SERVICEHPINOTES
reflux, if misses nexium she is miserable,  also she has constipatio diarrhea, fiber makes worse, some miralax, more constipation that diarrhea, and great grandparent with colon cancer.

## 2022-02-09 ENCOUNTER — OFFICE (OUTPATIENT)
Dept: URBAN - METROPOLITAN AREA CLINIC 66 | Facility: CLINIC | Age: 42
End: 2022-02-09

## 2022-02-09 VITALS
HEIGHT: 66 IN | WEIGHT: 149 LBS | SYSTOLIC BLOOD PRESSURE: 88 MMHG | HEART RATE: 99 BPM | DIASTOLIC BLOOD PRESSURE: 60 MMHG

## 2022-02-09 DIAGNOSIS — K59.00 CONSTIPATION, UNSPECIFIED: ICD-10-CM

## 2022-02-09 PROCEDURE — 99214 OFFICE O/P EST MOD 30 MIN: CPT | Performed by: NURSE PRACTITIONER

## 2022-02-09 RX ORDER — HYDROCORTISONE ACETATE 25 MG/1
25 SUPPOSITORY RECTAL
Qty: 14 | Refills: 1 | Status: COMPLETED
Start: 2022-02-09 | End: 2024-06-07

## 2023-11-17 NOTE — PROGRESS NOTES
Paty Vaughn  [id number]  1984  female     11/17/2023      Chief Complaint  Establish Care (Previously saw Dr. Rosario at [facility] in [place] ) [facility] pharmacy.  Subjective     Chief Complaint: post op ACDF     Patient ID: Mateusz Jordan is a 39 y.o. female is here today for follow-up.    History of Present Illness    This is a 39-year-old woman who underwent a C6-7 ACDF in 2017.  She continued to have residual left arm and shoulder pain.  CT myelogram of her cervical spine demonstrated nerve root compromise at C5-6 on the left side.  As such the patient underwent an uncomplicated C5-6 ACDF and exploration of prior C6-7 ACDF on 2018.    Presently, the patient is 2 weeks postop.  She is complaining of some occasional left arm burning pain, different than her preoperative pain.  Preoperative arm and shoulder pain has resolved.  Neck and shoulder pain is well controlled with muscle relaxers.  The patient is working on weaning herself off of narcotic pain medication and is only taking Percocet nightly.  She has not had any problems with her incision.  She has no other complaints at this time.    The following portions of the patient's history were reviewed and updated as appropriate: allergies, current medications, past family history, past medical history, past social history, past surgical history and problem list.    Family history:   Family History   Problem Relation Age of Onset   • No Known Problems Mother    • Throat cancer Father        Social history:   Social History     Socioeconomic History   • Marital status:      Spouse name: Not on file   • Number of children: Not on file   • Years of education: Not on file   • Highest education level: Not on file   Tobacco Use   • Smoking status: Former Smoker     Packs/day: 0.50     Years: 26.00     Pack years: 13.00     Types: Cigarettes     Last attempt to quit: 2019     Years since quittin.1   • Smokeless tobacco: Never Used   Substance and Sexual Activity   • Alcohol use: No   • Drug use: No   • Sexual activity: Defer       Review of Systems   Constitutional: Negative for activity change,  "[id number]    TUBAL ABDOMINAL LIGATION  11/appetite change, chills, diaphoresis, fatigue, fever and unexpected weight change.   HENT: Negative for congestion, dental problem, drooling, ear discharge, ear pain, facial swelling, hearing loss, mouth sores, nosebleeds, postnasal drip, rhinorrhea, sinus pressure, sneezing, sore throat, tinnitus, trouble swallowing and voice change.    Eyes: Negative for photophobia, pain, discharge, redness, itching and visual disturbance.   Respiratory: Negative for apnea, cough, choking, chest tightness, shortness of breath, wheezing and stridor.    Cardiovascular: Negative for chest pain, palpitations and leg swelling.   Gastrointestinal: Negative for abdominal distention, abdominal pain, anal bleeding, blood in stool, constipation, diarrhea, nausea, rectal pain and vomiting.   Endocrine: Negative for cold intolerance, heat intolerance, polydipsia, polyphagia and polyuria.   Genitourinary: Negative for decreased urine volume, difficulty urinating, dysuria, enuresis, flank pain, frequency, genital sores, hematuria and urgency.   Musculoskeletal: Positive for myalgias and neck stiffness. Negative for arthralgias, back pain, gait problem, joint swelling and neck pain.   Skin: Negative for color change, pallor, rash and wound.   Allergic/Immunologic: Negative for environmental allergies, food allergies and immunocompromised state.   Neurological: Positive for headaches. Negative for dizziness, tremors, seizures, syncope, facial asymmetry, speech difficulty, weakness, light-headedness and numbness.   Hematological: Negative for adenopathy. Does not bruise/bleed easily.   Psychiatric/Behavioral: Negative for agitation, behavioral problems, confusion, decreased concentration, dysphoric mood, hallucinations, self-injury, sleep disturbance and suicidal ideas. The patient is not nervous/anxious and is not hyperactive.        Objective   Blood pressure 102/64, temperature 97.2 °F (36.2 °C), temperature source Temporal, height 167.6 cm   Carotid pulses are 2+ on the right side and 2+ on the left side.       Radial pulses are 2+ on the right side and 2+ on the left side.        Dorsalis pedis pulses are 2+ on the right side and 2+ on the left side.        Posterior tibial pulses are 2+ on the right side and 2+ on the left side.      Heart sounds: Normal heart sounds, S1 normal and S2 normal. No murmur heard.  Pulmonary:      Effort: Pulmonary effort is normal.      Breath sounds: Normal breath sounds and air entry.   Abdominal:      General: Abdomen is flat. Bowel sounds are normal. There is no distension or abdominal bruit.      Palpations: Abdomen is soft.      Tenderness: There is no abdominal tenderness.   Musculoskeletal:         General: Normal range of motion.      Cervical back: Full passive range of motion without pain, normal range of motion and neck supple.      Right lower leg: No edema.      Left lower leg: No edema.   Skin:     General: Skin is warm and dry.      Capillary Refill: Capillary refill takes less than 2 seconds.      Coloration: Skin is not cyanotic or pale.      Findings: No bruising, erythema or rash.   Neurological:      General: No focal deficit present.      Mental Status: She is alert and oriented to person, place, and time. Mental status is at baseline.      GCS: GCS eye subscore is 4. GCS verbal subscore is 5. GCS motor subscore is 6.      Cranial Nerves: Cranial nerves 2-12 are intact. No cranial nerve deficit.      Sensory: Sensation is intact. No sensory deficit.      Motor: Motor function is intact. No weakness.      Coordination: Coordination is intact. Coordination normal.      Gait: Gait is intact. Gait normal.      Deep Tendon Reflexes: Reflexes normal.   Psychiatric:         Attention and Perception: Attention and perception normal.         Mood and Affect: Mood and affect normal.         Speech: Speech normal.         Behavior: Behavior normal. Behavior is cooperative.         Thought Content: Thought  "(65.98\"), weight 66.7 kg (147 lb), not currently breastfeeding.  Body mass index is 23.74 kg/m².    Physical Exam   Constitutional: She is oriented to person, place, and time. She appears well-developed and well-nourished. No distress.   HENT:   Head:       Pulmonary/Chest: Effort normal.   Neurological: She is alert and oriented to person, place, and time. She has normal strength. Gait normal. GCS eye subscore is 4. GCS verbal subscore is 5. GCS motor subscore is 6.   Skin: Skin is warm and dry. She is not diaphoretic. No erythema.   Psychiatric: She has a normal mood and affect.         Assessment/Plan     Ms. Worley is 2 weeks status post C5-6 ACDF.  Patient is having some burning/tingling pain in her left upper extremity.  I would like for her to increase gabapentin to 300 mg 3 times daily.  She is working on weaning herself off of narcotic pain medication.  She was given a small prescription for tramadol for pain control.  Discussed limitations and restrictions for best possible outcome.  She was instructed to call with any new or worsening symptoms.  Otherwise, she will follow-up with us in 1 month with postoperative cervical x-rays.    Mateusz was seen today for post-op.    Diagnoses and all orders for this visit:    Status post cervical spinal fusion  -     XR Spine Cervical 2 View        Return in about 4 weeks (around 10/7/2019), or if symptoms worsen or fail to improve.             Lilia Shepard PA-C        " "

## 2024-02-23 NOTE — PLAN OF CARE
Problem: Perioperative Period (Adult)  Goal: Signs and Symptoms of Listed Potential Problems Will be Absent or Manageable (Perioperative Period)  Outcome: Ongoing (interventions implemented as appropriate)       Yes

## 2024-06-07 ENCOUNTER — OFFICE (OUTPATIENT)
Dept: URBAN - METROPOLITAN AREA CLINIC 76 | Facility: CLINIC | Age: 44
End: 2024-06-07

## 2024-06-07 VITALS
DIASTOLIC BLOOD PRESSURE: 74 MMHG | SYSTOLIC BLOOD PRESSURE: 111 MMHG | HEART RATE: 99 BPM | HEIGHT: 66 IN | OXYGEN SATURATION: 99 % | WEIGHT: 172 LBS

## 2024-06-07 DIAGNOSIS — R13.10 DYSPHAGIA, UNSPECIFIED: ICD-10-CM

## 2024-06-07 DIAGNOSIS — K64.0 FIRST DEGREE HEMORRHOIDS: ICD-10-CM

## 2024-06-07 DIAGNOSIS — K21.9 GASTRO-ESOPHAGEAL REFLUX DISEASE WITHOUT ESOPHAGITIS: ICD-10-CM

## 2024-06-07 DIAGNOSIS — K58.1 IRRITABLE BOWEL SYNDROME WITH CONSTIPATION: ICD-10-CM

## 2024-06-07 DIAGNOSIS — R10.9 UNSPECIFIED ABDOMINAL PAIN: ICD-10-CM

## 2024-06-07 DIAGNOSIS — K62.5 HEMORRHAGE OF ANUS AND RECTUM: ICD-10-CM

## 2024-06-07 PROCEDURE — 99214 OFFICE O/P EST MOD 30 MIN: CPT

## 2024-06-07 RX ORDER — PLECANATIDE 3 MG/1
3 TABLET ORAL
Qty: 90 | Refills: 3 | Status: ACTIVE
Start: 2024-06-07

## 2024-07-15 ENCOUNTER — AMBULATORY SURGICAL CENTER (OUTPATIENT)
Dept: URBAN - METROPOLITAN AREA SURGERY 20 | Facility: SURGERY | Age: 44
End: 2024-07-15
Payer: COMMERCIAL

## 2024-07-15 VITALS — HEIGHT: 66 IN

## 2024-07-15 DIAGNOSIS — K31.89 OTHER DISEASES OF STOMACH AND DUODENUM: ICD-10-CM

## 2024-07-15 DIAGNOSIS — K31.7 POLYP OF STOMACH AND DUODENUM: ICD-10-CM

## 2024-07-15 DIAGNOSIS — K21.00 GASTRO-ESOPHAGEAL REFLUX DISEASE WITH ESOPHAGITIS, WITHOUT B: ICD-10-CM

## 2024-07-15 DIAGNOSIS — K22.2 ESOPHAGEAL OBSTRUCTION: ICD-10-CM

## 2024-07-15 DIAGNOSIS — R13.10 DYSPHAGIA, UNSPECIFIED: ICD-10-CM

## 2024-07-15 LAB
GI HISTOLOGY: A. GASTRIC BX: (no result)
GI HISTOLOGY: B. GASTRIC POLYPS: (no result)
GI HISTOLOGY: PDF REPORT: (no result)
Lab: (no result)

## 2024-07-15 PROCEDURE — 43450 DILATE ESOPHAGUS 1/MULT PASS: CPT | Performed by: INTERNAL MEDICINE

## 2024-07-15 PROCEDURE — 43239 EGD BIOPSY SINGLE/MULTIPLE: CPT | Performed by: INTERNAL MEDICINE

## 2024-07-15 NOTE — SERVICEHPINOTES
having dysphagia for solids.  on voquezna in the am, and has to take nexium at night . trulance has helped constipation but has cause diarrhea wit daily use.   she is also on amitriptyline 150 mg bedtime, also duloxeitine 50 mg bedtime.  on meloxicam and lorazepam( bedtime)   she is also on topiramate

## 2024-07-16 ENCOUNTER — OFFICE (OUTPATIENT)
Dept: URBAN - METROPOLITAN AREA PATHOLOGY 4 | Facility: PATHOLOGY | Age: 44
End: 2024-07-16
Payer: COMMERCIAL

## 2024-07-16 DIAGNOSIS — K31.89 OTHER DISEASES OF STOMACH AND DUODENUM: ICD-10-CM

## 2024-07-16 DIAGNOSIS — K21.00 GASTRO-ESOPHAGEAL REFLUX DISEASE WITH ESOPHAGITIS, WITHOUT B: ICD-10-CM

## 2024-07-16 DIAGNOSIS — K31.7 POLYP OF STOMACH AND DUODENUM: ICD-10-CM

## 2024-07-16 PROCEDURE — 88305 TISSUE EXAM BY PATHOLOGIST: CPT | Performed by: PATHOLOGY

## 2024-09-24 ENCOUNTER — OFFICE (OUTPATIENT)
Age: 44
End: 2024-09-24
Payer: COMMERCIAL

## 2024-09-24 ENCOUNTER — OFFICE (OUTPATIENT)
Dept: URBAN - METROPOLITAN AREA CLINIC 76 | Facility: CLINIC | Age: 44
End: 2024-09-24
Payer: COMMERCIAL

## 2024-09-24 VITALS
WEIGHT: 168 LBS | DIASTOLIC BLOOD PRESSURE: 64 MMHG | SYSTOLIC BLOOD PRESSURE: 96 MMHG | HEART RATE: 88 BPM | DIASTOLIC BLOOD PRESSURE: 64 MMHG | WEIGHT: 168 LBS | OXYGEN SATURATION: 98 % | HEART RATE: 88 BPM | OXYGEN SATURATION: 98 % | SYSTOLIC BLOOD PRESSURE: 96 MMHG | SYSTOLIC BLOOD PRESSURE: 96 MMHG | HEIGHT: 66 IN | WEIGHT: 168 LBS | HEART RATE: 88 BPM | DIASTOLIC BLOOD PRESSURE: 64 MMHG | SYSTOLIC BLOOD PRESSURE: 96 MMHG | HEIGHT: 66 IN | HEIGHT: 66 IN | SYSTOLIC BLOOD PRESSURE: 96 MMHG | DIASTOLIC BLOOD PRESSURE: 64 MMHG | SYSTOLIC BLOOD PRESSURE: 96 MMHG | HEART RATE: 88 BPM | SYSTOLIC BLOOD PRESSURE: 96 MMHG | WEIGHT: 168 LBS | OXYGEN SATURATION: 98 % | WEIGHT: 168 LBS | WEIGHT: 168 LBS | HEIGHT: 66 IN | HEIGHT: 66 IN | HEIGHT: 66 IN | DIASTOLIC BLOOD PRESSURE: 64 MMHG | HEART RATE: 88 BPM | OXYGEN SATURATION: 98 % | OXYGEN SATURATION: 98 % | HEIGHT: 66 IN | HEART RATE: 88 BPM | HEART RATE: 88 BPM | OXYGEN SATURATION: 98 % | DIASTOLIC BLOOD PRESSURE: 64 MMHG | WEIGHT: 168 LBS | OXYGEN SATURATION: 98 % | DIASTOLIC BLOOD PRESSURE: 64 MMHG

## 2024-09-24 DIAGNOSIS — K59.04 CHRONIC IDIOPATHIC CONSTIPATION: ICD-10-CM

## 2024-09-24 DIAGNOSIS — K21.9 GASTRO-ESOPHAGEAL REFLUX DISEASE WITHOUT ESOPHAGITIS: ICD-10-CM

## 2024-09-24 PROCEDURE — 99214 OFFICE O/P EST MOD 30 MIN: CPT

## 2025-01-10 ENCOUNTER — OFFICE (OUTPATIENT)
Dept: URBAN - METROPOLITAN AREA CLINIC 76 | Facility: CLINIC | Age: 45
End: 2025-01-10

## 2025-01-10 VITALS
HEIGHT: 66 IN | DIASTOLIC BLOOD PRESSURE: 70 MMHG | SYSTOLIC BLOOD PRESSURE: 108 MMHG | HEART RATE: 89 BPM | WEIGHT: 160 LBS | OXYGEN SATURATION: 96 %

## 2025-01-10 DIAGNOSIS — K21.9 GASTRO-ESOPHAGEAL REFLUX DISEASE WITHOUT ESOPHAGITIS: ICD-10-CM

## 2025-01-10 DIAGNOSIS — K31.84 GASTROPARESIS: ICD-10-CM

## 2025-01-10 DIAGNOSIS — K59.04 CHRONIC IDIOPATHIC CONSTIPATION: ICD-10-CM

## 2025-01-10 PROCEDURE — 99214 OFFICE O/P EST MOD 30 MIN: CPT

## 2025-01-10 RX ORDER — TENAPANOR HYDROCHLORIDE 53.2 MG/1
100 TABLET ORAL
Qty: 180 | Refills: 3 | Status: ACTIVE
Start: 2025-01-10

## (undated) DEVICE — TB SXN FRAZIER 10F STRL

## (undated) DEVICE — ULTRACLEAN ACCESSORY ELECTRODE 4" (10.16 CM) COATED BLADE: Brand: ULTRACLEAN

## (undated) DEVICE — KITTNER SPONGE: Brand: DEROYAL

## (undated) DEVICE — APPL CHLORAPREP W/TINT 26ML BLU

## (undated) DEVICE — TOOL 14MH30 LEGEND 14CM 3MM: Brand: MIDAS REX ™

## (undated) DEVICE — ELECTRD NDL EZ CLN MOD 2.75IN

## (undated) DEVICE — DRSNG TELFA PAD NONADH STR 1S 3X8IN

## (undated) DEVICE — SPNG GZ WOVN 4X4IN 12PLY 10/BX STRL

## (undated) DEVICE — MEDI-VAC YANKAUER SUCTION HANDLE W/BULBOUS TIP: Brand: CARDINAL HEALTH

## (undated) DEVICE — DISPOSABLE BIPOLAR FORCEPS 7 3/4" (19.7CM) SCOVILLE BAYONET, INSULATED, 1.5MM TIP AND 12 FT. (3.6M) CABLE: Brand: KIRWAN

## (undated) DEVICE — GLV SURG SENSICARE W/ALOE PF LF 7 STRL

## (undated) DEVICE — SNAP KOVER: Brand: UNBRANDED

## (undated) DEVICE — TB SXN FRAZIER 12F STRL

## (undated) DEVICE — ENCORE® LATEX MICRO SIZE 7, STERILE LATEX POWDER-FREE SURGICAL GLOVE: Brand: ENCORE

## (undated) DEVICE — 3M™ MEDIPORE™ H SOFT CLOTH SURGICAL TAPE, 2863, 3 IN X 10 YD, 12/CASE: Brand: 3M™ MEDIPORE™

## (undated) DEVICE — GLV SURG BIOGEL LTX PF 8

## (undated) DEVICE — NDL SPINE 20G 3 1/2 YEL STRL 1P/U

## (undated) DEVICE — SUT VICYL 2/0 CR8 18IN DYED J726D

## (undated) DEVICE — NDL HYPO ECLPS SFTY 25G 1 1/2IN

## (undated) DEVICE — JACKSON-PRATT 100CC BULB RESERVOIR: Brand: CARDINAL HEALTH

## (undated) DEVICE — PK NEURO DISC 10

## (undated) DEVICE — GLV SURG DERMASSURE GRN LF PF 7.0

## (undated) DEVICE — 3M™ STERI-DRAPE™ INSTRUMENT POUCH 1018: Brand: STERI-DRAPE™

## (undated) DEVICE — MAGNETIC DRAPE: Brand: DEVON

## (undated) DEVICE — ENCORE® LATEX MICRO SIZE 6.5, STERILE LATEX POWDER-FREE SURGICAL GLOVE: Brand: ENCORE

## (undated) DEVICE — ANTIBACTERIAL UNDYED BRAIDED (POLYGLACTIN 910), SYNTHETIC ABSORBABLE SUTURE: Brand: COATED VICRYL

## (undated) DEVICE — JP PERF DRN SIL FLT 7MM FULL: Brand: CARDINAL HEALTH

## (undated) DEVICE — 3M™ WARMING BLANKET, LOWER BODY, 10 PER CASE, 42568: Brand: BAIR HUGGER™

## (undated) DEVICE — HOLDER: Brand: DEROYAL

## (undated) DEVICE — LIMB HOLDERS: Brand: DEROYAL

## (undated) DEVICE — TRY L/P SFTY A/20G

## (undated) DEVICE — MEDI-VAC NON-CONDUCTIVE SUCTION TUBING: Brand: CARDINAL HEALTH

## (undated) DEVICE — BANDAGE,GAUZE,BULKEE II,4.5"X4.1YD,STRL: Brand: MEDLINE

## (undated) DEVICE — SKIN AFFIX SURG ADHESIVE 72/CS 0.55ML: Brand: MEDLINE

## (undated) DEVICE — GLV SURG PREMIERPRO MIC LTX PF SZ8.5 BRN

## (undated) DEVICE — SUT SILK 2/0 TIES 18IN A185H

## (undated) DEVICE — TB SXN FRAZIER 8F STRL

## (undated) DEVICE — AIRWY 90MM NO9

## (undated) DEVICE — ELECTRD BLD EDGE/INSUL1P 2.4X5.1MM STRL

## (undated) DEVICE — CANNULA,OXY,ADULT,SUPERSOFT,W/7'TUB,UC: Brand: MEDLINE

## (undated) DEVICE — ENCORE® LATEX MICRO SIZE 8.5, STERILE LATEX POWDER-FREE SURGICAL GLOVE: Brand: ENCORE

## (undated) DEVICE — PIN DISTRACT 14MM SS STRL PK/2

## (undated) DEVICE — GLV SURG PREMIERPRO MIC LTX PF SZ7 BRN

## (undated) DEVICE — SKYLINE ANTERIOR CERVICAL PLATE SYSTEM DRILL 2.2 X 12MM: Brand: SKYLINE

## (undated) DEVICE — DRSNG WND GZ PAD BORDERED LF 4X5IN STRL

## (undated) DEVICE — ELECTRD BLD EXT EDGE/INSUL 1P 4IN

## (undated) DEVICE — GLV SURG PREMIERPRO MIC LTX PF SZ6.5 BRN